# Patient Record
Sex: MALE | Race: WHITE | NOT HISPANIC OR LATINO | Employment: FULL TIME | URBAN - METROPOLITAN AREA
[De-identification: names, ages, dates, MRNs, and addresses within clinical notes are randomized per-mention and may not be internally consistent; named-entity substitution may affect disease eponyms.]

---

## 2019-03-28 ENCOUNTER — HOSPITAL ENCOUNTER (EMERGENCY)
Facility: HOSPITAL | Age: 49
Discharge: HOME | End: 2019-03-28
Attending: EMERGENCY MEDICINE
Payer: OTHER MISCELLANEOUS

## 2019-03-28 VITALS
DIASTOLIC BLOOD PRESSURE: 110 MMHG | HEIGHT: 70 IN | SYSTOLIC BLOOD PRESSURE: 201 MMHG | OXYGEN SATURATION: 100 % | BODY MASS INDEX: 26.22 KG/M2 | TEMPERATURE: 98 F | RESPIRATION RATE: 15 BRPM | HEART RATE: 77 BPM | WEIGHT: 183.19 LBS

## 2019-03-28 DIAGNOSIS — K40.90 HERNIA, INGUINAL, RIGHT: Primary | ICD-10-CM

## 2019-03-28 PROCEDURE — 99283 EMERGENCY DEPT VISIT LOW MDM: CPT

## 2019-03-28 RX ORDER — DOCUSATE SODIUM 100 MG/1
100 CAPSULE, LIQUID FILLED ORAL 3 TIMES DAILY PRN
Qty: 30 CAPSULE | Refills: 0 | Status: SHIPPED | OUTPATIENT
Start: 2019-03-28

## 2019-03-28 NOTE — ED PROVIDER NOTES
"SCRIBE #1 NOTE: I, Jennifer Torres, am scribing for, and in the presence of, Diego Spencer MD. I have scribed the entire note.       History     Chief Complaint   Patient presents with    Abdominal Pain     pt reports heavy lifting yesterday & "pulling something in his abdomen." pt reports knot to groin area + pain this morning.     Review of patient's allergies indicates:  No Known Allergies      History of Present Illness     HPI    3/28/2019, 10:00 AM  History obtained from the patient      History of Present Illness: Rajesh Swan is a 48 y.o. male patient with a PMHx of HTN who presents to the Emergency Department for evaluation of R groin pain which onset gradually 1 day ago. Pt reports heavy lifting 1 day ago. He states the pain is a burning sensation, and this AM he woke up with a knot to his R groin. Pt denies any previous hx of hernia. Symptoms are constant and moderate in severity. No mitigating or exacerbating factors reported. No associated sxs. Patient denies any fever, chills, n/v/d, dysuria, hematuria, flank pain, penile pain, scrotal swelling, and all other sxs at this time. No further complaints or concerns at this time.       Arrival mode: Personal vehicle     PCP: Primary Doctor No        Past Medical History:  Past Medical History:   Diagnosis Date    Depression     Hypertension     Narcotic abuse        Past Surgical History:  History reviewed. No pertinent surgical history.    Family History:  History reviewed. No pertinent family history.    Social History:  Social History     Tobacco Use    Smoking status: Current Every Day Smoker     Packs/day: 0.50     Types: Cigarettes    Smokeless tobacco: Former User   Substance and Sexual Activity    Alcohol use: No    Drug use: No     Comment: Recovering narcotic addict    Sexual activity: Unknown        Review of Systems     Review of Systems   Constitutional: Negative for chills and fever.   HENT: Negative for congestion and sore " throat.    Respiratory: Negative for cough and shortness of breath.    Cardiovascular: Negative for chest pain.   Gastrointestinal: Negative for abdominal pain, diarrhea, nausea and vomiting.   Genitourinary: Negative for dysuria, flank pain, hematuria, penile pain and scrotal swelling.        (+) groin pain   Musculoskeletal: Negative for back pain and neck pain.   Skin: Negative for rash.   Neurological: Negative for dizziness, weakness, numbness and headaches.   Hematological: Does not bruise/bleed easily.   All other systems reviewed and are negative.       Physical Exam     Initial Vitals [03/28/19 0947]   BP Pulse Resp Temp SpO2   (!) 201/110 77 15 97.9 °F (36.6 °C) 100 %      MAP       --          Physical Exam  Nursing Notes and Vital Signs Reviewed.  Constitutional: Patient is in no acute distress. Well-developed and well-nourished.  Head: Atraumatic. Normocephalic.  Eyes: PERRL. EOM intact. Conjunctivae are not pale. No scleral icterus.  ENT: Mucous membranes are moist. Oropharynx is clear and symmetric.    Neck: Supple. Full ROM. No lymphadenopathy.  Cardiovascular: Regular rate. Regular rhythm. No murmurs, rubs, or gallops. Distal pulses are 2+ and symmetric.  Pulmonary/Chest: No respiratory distress. Clear to auscultation bilaterally. No wheezing or rales.  Abdominal: Soft and non-distended.  There is no tenderness.  No rebound, guarding, or rigidity. Good bowel sounds.  Genitourinary: No CVA tenderness. Reducible hernia to R inguinal canal  Musculoskeletal: Moves all extremities. No obvious deformities. No edema. No calf tenderness.  Skin: Warm and dry.  Neurological:  Alert, awake, and appropriate.  Normal speech.  No acute focal neurological deficits are appreciated.  Psychiatric: Normal affect. Good eye contact. Appropriate in content.     ED Course   Procedures  ED Vital Signs:  Vitals:    03/28/19 0947   BP: (!) 201/110   Pulse: 77   Resp: 15   Temp: 97.9 °F (36.6 °C)   TempSrc: Oral   SpO2: 100%  "  Weight: 83.1 kg (183 lb 3.2 oz)   Height: 5' 10" (1.778 m)            The Emergency Provider reviewed the vital signs and test results, which are outlined above.     ED Discussion     10:04 AM: Reassessed pt at this time. Patient is awake, alert, and in NAD. Pt states his condition has improved at this time. Discussed with pt all pertinent ED information and results. Discussed pt dx and plan of tx. Gave pt all f/u and return to the ED instructions. All questions and concerns were addressed at this time. Pt expresses understanding of information and instructions, and is comfortable with plan to discharge. Pt is stable for discharge.    I discussed with patient and/or family/caretaker that evaluation in the ED does not suggest any emergent or life threatening medical conditions requiring immediate intervention beyond what was provided in the ED, and I believe patient is safe for discharge.  Regardless, an unremarkable evaluation in the ED does not preclude the development or presence of a serious of life threatening condition. As such, patient was instructed to return immediately for any worsening or change in current symptoms.    ED Medication(s):  Medications - No data to display    New Prescriptions    DOCUSATE SODIUM (COLACE) 100 MG CAPSULE    Take 1 capsule (100 mg total) by mouth 3 (three) times daily as needed for Constipation.       Follow-up Information     General Surgery.    Contact information:  [phone]                                   Scribe Attestation:   Scribe #1: I performed the above scribed service and the documentation accurately describes the services I performed. I attest to the accuracy of the note.     Attending:   Physician Attestation Statement for Scribe #1: I, Diego Spencer MD, personally performed the services described in this documentation, as scribed by Jennifer Torres, in my presence, and it is both accurate and complete.           Clinical Impression       ICD-10-CM ICD-9-CM "   1. Hernia, inguinal, right K40.90 550.90       Disposition:   Disposition: Discharged  Condition: Stable         Diego Spencer MD  03/28/19 1016

## 2019-04-03 ENCOUNTER — TELEPHONE (OUTPATIENT)
Dept: SURGERY | Facility: CLINIC | Age: 49
End: 2019-04-03

## 2019-04-03 NOTE — TELEPHONE ENCOUNTER
----- Message from Lisa Brand LPN sent at 4/3/2019 11:11 AM CDT -----      ----- Message -----  From: Gabriela Rangel  Sent: 4/3/2019  10:26 AM  To: [facility] Clinical Support    . This patient was seen in ED for a hernia and was advised to schedule appt with general surgery. This is a work comp patient. Does general surgery see patients under w/comp?      Thanks

## 2019-04-08 ENCOUNTER — OFFICE VISIT (OUTPATIENT)
Dept: SURGERY | Facility: CLINIC | Age: 49
End: 2019-04-08
Payer: OTHER MISCELLANEOUS

## 2019-04-08 ENCOUNTER — LAB VISIT (OUTPATIENT)
Dept: LAB | Facility: HOSPITAL | Age: 49
End: 2019-04-08
Attending: SURGERY
Payer: OTHER MISCELLANEOUS

## 2019-04-08 ENCOUNTER — CLINICAL SUPPORT (OUTPATIENT)
Dept: CARDIOLOGY | Facility: CLINIC | Age: 49
End: 2019-04-08
Payer: OTHER MISCELLANEOUS

## 2019-04-08 VITALS
BODY MASS INDEX: 25.85 KG/M2 | TEMPERATURE: 98 F | DIASTOLIC BLOOD PRESSURE: 100 MMHG | HEIGHT: 70 IN | SYSTOLIC BLOOD PRESSURE: 157 MMHG | WEIGHT: 180.56 LBS | HEART RATE: 86 BPM

## 2019-04-08 DIAGNOSIS — K40.20 NON-RECURRENT BILATERAL INGUINAL HERNIA WITHOUT OBSTRUCTION OR GANGRENE: ICD-10-CM

## 2019-04-08 DIAGNOSIS — K40.20 NON-RECURRENT BILATERAL INGUINAL HERNIA WITHOUT OBSTRUCTION OR GANGRENE: Primary | ICD-10-CM

## 2019-04-08 LAB
ALBUMIN SERPL BCP-MCNC: 3.9 G/DL (ref 3.5–5.2)
ALP SERPL-CCNC: 67 U/L (ref 55–135)
ALT SERPL W/O P-5'-P-CCNC: 39 U/L (ref 10–44)
ANION GAP SERPL CALC-SCNC: 5 MMOL/L (ref 8–16)
AST SERPL-CCNC: 27 U/L (ref 10–40)
BASOPHILS # BLD AUTO: 0.03 K/UL (ref 0–0.2)
BASOPHILS NFR BLD: 0.4 % (ref 0–1.9)
BILIRUB SERPL-MCNC: 0.4 MG/DL (ref 0.1–1)
BUN SERPL-MCNC: 18 MG/DL (ref 6–20)
CALCIUM SERPL-MCNC: 9.7 MG/DL (ref 8.7–10.5)
CHLORIDE SERPL-SCNC: 106 MMOL/L (ref 95–110)
CO2 SERPL-SCNC: 29 MMOL/L (ref 23–29)
CREAT SERPL-MCNC: 1 MG/DL (ref 0.5–1.4)
DIFFERENTIAL METHOD: NORMAL
EOSINOPHIL # BLD AUTO: 0.2 K/UL (ref 0–0.5)
EOSINOPHIL NFR BLD: 3.2 % (ref 0–8)
ERYTHROCYTE [DISTWIDTH] IN BLOOD BY AUTOMATED COUNT: 13.2 % (ref 11.5–14.5)
EST. GFR  (AFRICAN AMERICAN): >60 ML/MIN/1.73 M^2
EST. GFR  (NON AFRICAN AMERICAN): >60 ML/MIN/1.73 M^2
GLUCOSE SERPL-MCNC: 75 MG/DL (ref 70–110)
HCT VFR BLD AUTO: 48.1 % (ref 40–54)
HGB BLD-MCNC: 15.9 G/DL (ref 14–18)
IMM GRANULOCYTES # BLD AUTO: 0.02 K/UL (ref 0–0.04)
IMM GRANULOCYTES NFR BLD AUTO: 0.3 % (ref 0–0.5)
LYMPHOCYTES # BLD AUTO: 2.2 K/UL (ref 1–4.8)
LYMPHOCYTES NFR BLD: 31 % (ref 18–48)
MCH RBC QN AUTO: 29.6 PG (ref 27–31)
MCHC RBC AUTO-ENTMCNC: 33.1 G/DL (ref 32–36)
MCV RBC AUTO: 89 FL (ref 82–98)
MONOCYTES # BLD AUTO: 0.8 K/UL (ref 0.3–1)
MONOCYTES NFR BLD: 10.7 % (ref 4–15)
NEUTROPHILS # BLD AUTO: 3.9 K/UL (ref 1.8–7.7)
NEUTROPHILS NFR BLD: 54.4 % (ref 38–73)
NRBC BLD-RTO: 0 /100 WBC
PLATELET # BLD AUTO: 241 K/UL (ref 150–350)
PMV BLD AUTO: 10.7 FL (ref 9.2–12.9)
POTASSIUM SERPL-SCNC: 4.3 MMOL/L (ref 3.5–5.1)
PROT SERPL-MCNC: 7.7 G/DL (ref 6–8.4)
RBC # BLD AUTO: 5.38 M/UL (ref 4.6–6.2)
SODIUM SERPL-SCNC: 140 MMOL/L (ref 136–145)
WBC # BLD AUTO: 7.1 K/UL (ref 3.9–12.7)

## 2019-04-08 PROCEDURE — 93005 ELECTROCARDIOGRAM TRACING: CPT | Mod: S$GLB,,, | Performed by: SURGERY

## 2019-04-08 PROCEDURE — 80053 COMPREHEN METABOLIC PANEL: CPT

## 2019-04-08 PROCEDURE — 99214 OFFICE O/P EST MOD 30 MIN: CPT | Mod: PBBFAC,25,PN | Performed by: SURGERY

## 2019-04-08 PROCEDURE — 93010 ELECTROCARDIOGRAM REPORT: CPT | Mod: S$GLB,,, | Performed by: NUCLEAR MEDICINE

## 2019-04-08 PROCEDURE — 85025 COMPLETE CBC W/AUTO DIFF WBC: CPT

## 2019-04-08 PROCEDURE — 99999 PR PBB SHADOW E&M-EST. PATIENT-LVL IV: CPT | Mod: PBBFAC,,, | Performed by: SURGERY

## 2019-04-08 PROCEDURE — 93005 EKG 12-LEAD: ICD-10-PCS | Mod: S$GLB,,, | Performed by: SURGERY

## 2019-04-08 PROCEDURE — 99203 PR OFFICE/OUTPT VISIT, NEW, LEVL III, 30-44 MIN: ICD-10-PCS | Mod: S$GLB,,, | Performed by: SURGERY

## 2019-04-08 PROCEDURE — 93005 ELECTROCARDIOGRAM TRACING: CPT | Mod: PBBFAC,PN | Performed by: NUCLEAR MEDICINE

## 2019-04-08 PROCEDURE — 93010 EKG 12-LEAD: ICD-10-PCS | Mod: S$GLB,,, | Performed by: NUCLEAR MEDICINE

## 2019-04-08 PROCEDURE — 99203 OFFICE O/P NEW LOW 30 MIN: CPT | Mod: S$GLB,,, | Performed by: SURGERY

## 2019-04-08 PROCEDURE — 36415 COLL VENOUS BLD VENIPUNCTURE: CPT

## 2019-04-08 PROCEDURE — 99999 PR PBB SHADOW E&M-EST. PATIENT-LVL IV: ICD-10-PCS | Mod: PBBFAC,,, | Performed by: SURGERY

## 2019-04-08 RX ORDER — HYDROCHLOROTHIAZIDE 25 MG/1
25 TABLET ORAL DAILY
Qty: 30 TABLET | Refills: 0 | Status: SHIPPED | OUTPATIENT
Start: 2019-04-08

## 2019-04-08 RX ORDER — METOPROLOL SUCCINATE 50 MG/1
50 TABLET, EXTENDED RELEASE ORAL DAILY
Qty: 30 TABLET | Refills: 0 | Status: SHIPPED | OUTPATIENT
Start: 2019-04-08 | End: 2020-01-29

## 2019-04-08 RX ORDER — SODIUM CHLORIDE 9 MG/ML
INJECTION, SOLUTION INTRAVENOUS CONTINUOUS
Status: CANCELLED | OUTPATIENT
Start: 2019-04-08

## 2019-04-08 RX ORDER — LIDOCAINE HYDROCHLORIDE 10 MG/ML
1 INJECTION, SOLUTION EPIDURAL; INFILTRATION; INTRACAUDAL; PERINEURAL ONCE
Status: CANCELLED | OUTPATIENT
Start: 2019-04-08 | End: 2019-04-08

## 2019-04-08 RX ORDER — LISINOPRIL 40 MG/1
40 TABLET ORAL DAILY
Qty: 30 TABLET | Refills: 0 | Status: SHIPPED | OUTPATIENT
Start: 2019-04-08 | End: 2020-01-29

## 2019-04-08 NOTE — PATIENT INSTRUCTIONS
Hernia (Adult)    A hernia can happen when there is a weakness or defect in the wall of the abdomen or groin. Intestines or nearby tissues may move from their usual location and push through the weakness in the wall. This can cause a hernia (bulge) you may see or feel.  Causes and Risk Factors   A hernia may be present at birth. Or it may be caused by the wear and tear of daily living. Certain factors can make a hernia more likely. These can include:  · Heavy lifting  · Straining, whether from lifting, movement, or constipation  · Chronic cough  · Injury to the abdominal wall  · Excess weight  · Pregnancy  · Prior surgery  · Older age  · Family history of hernia  Symptoms  Symptoms of a hernia may come on suddenly. Or they may appear slowly over time. Some common symptoms include:  · Bulge in the groin area, around the navel, or in the scrotum (the bulge may get bigger when you stand and go away when you lie down)  · Pain or pressure around the bulge  · Pain during activities such as lifting, coughing, or sneezing  · A feeling of weakness or pressure in the groin  · Pain or swelling in the scrotum  Types of hernias  There are different types of hernia. The type you have depends on its location:  · Inguinal: This type is in the groin or scrotum. It is more common in men.  · Femoral: This type is in the groin, upper thigh (where the leg bends), or labia. It is more common in women.  · Ventral: This type is in the abdominal wall.  · Umbilical: This type occurs around the navel (belly button).  · Incisional: This type occurs at the site of a previous surgery.  The condition of the hernia can help determine how urgently it needs to be treated.  · Reducible: It goes back in by itself, or it can be pushed back in.  · Irreducible: It cant be pushed back in.  · Incarcerated/Strangulated: The intestine is trapped (incarcerated). If this happens, you wont be able to push the bulge back in. If the incarcerated hernia isnt  treated, it may become strangulated. This means the area loses blood supply and the tissue may die. This requires emergency surgery! Treatment is needed right away!  In most cases, a hernia will not heal on its own. Surgery is usually needed to repair the defect in the abdominal wall or groin. Youll be told more about surgery, if needed.  If your symptoms are not severe, treatment may sometimes be delayed. In such cases, regular follow-up visits with the provider will be needed. Youll be asked to keep track of your symptoms and to watch for signs of more serious problems. You may also be given guidelines similar to the home care instructions below.  Home Care  To help keep a hernia from getting worse, you may be advised to:  · Avoid heavy lifting and straining as directed.  · Take steps to prevent constipation, such as eating more fiber and drinking more water. This may help reduce straining that can occur when having a bowel movement. Reducing straining may help keep your symptoms from getting worse.  · Maintain a healthy weight or lose excess weight. This can help reduce strain on abdominal muscles and tissues.  · Stop smoking. This can help prevent coughing that may also strain abdominal muscles and tissues.  Follow-up care  Follow up with your healthcare provider, or as directed. If imaging tests were done, they will be reviewed a doctor. You will be told the results and any new findings that may affect your care.  When to seek medical advice  Call your healthcare provider right away if any of these occur:  · Hernia hardens, swells, or grows larger  · Hernia can no longer be pushed back in  · Pain moves to the lower right abdomen (just below the waistline), or spreads to the back  Call 911  Call 911 right away if any of these occur:  · Nausea and vomiting  · Severe pain, redness, or tenderness in the area near the hernia  · Pain worsens quickly and doesnt get better  · Inability to have a bowel movement or  pass gas  · Fever of 100.4°F (38°C) or higher  · Trouble breathing  · Fainting  · Rapid heart rate  · Vomiting blood  · Large amounts of blood in stool  Date Last Reviewed: 6/9/2015  © 5511-0418 [employer]. [address] [place] [address], [place] [address]. All rights reserved. This information is not intended as a substitute for professional medical care. Always follow your healthcare professional's instructions.

## 2019-04-09 NOTE — PROGRESS NOTES
History & Physical    SUBJECTIVE:     History of Present Illness:  Patient is a 48 y.o. male referred for inguinal hernia.  Patient reports having bulge initially started his right groin with some discomfort but now has a bulge in his left groin as well. It does go down when he presses on it and lies flat.    Chief Complaint   Patient presents with    Consult       Review of patient's allergies indicates:  No Known Allergies    Current Outpatient Medications   Medication Sig Dispense Refill    ibuprofen (ADVIL,MOTRIN) 600 MG tablet Take 600 mg by mouth 2 (two) times daily as needed for Pain.      docusate sodium (COLACE) 100 MG capsule Take 1 capsule (100 mg total) by mouth 3 (three) times daily as needed for Constipation. 30 capsule 0    hydroCHLOROthiazide (HYDRODIURIL) 25 MG tablet Take 1 tablet (25 mg total) by mouth once daily. 30 tablet 0    lisinopril (PRINIVIL,ZESTRIL) 40 MG tablet Take 1 tablet (40 mg total) by mouth once daily. 30 tablet 0    metoprolol succinate (TOPROL-XL) 50 MG 24 hr tablet Take 1 tablet (50 mg total) by mouth once daily. 30 tablet 0     No current facility-administered medications for this visit.        Past Medical History:   Diagnosis Date    Depression     Hypertension     Narcotic abuse      History reviewed. No pertinent surgical history.  History reviewed. No pertinent family history.  Social History     Tobacco Use    Smoking status: Current Every Day Smoker     Packs/day: 0.50     Types: Cigarettes    Smokeless tobacco: Former User   Substance Use Topics    Alcohol use: No    Drug use: No     Comment: Recovering narcotic addict        Review of Systems:  Review of Systems   Constitutional: Negative for chills, fever and unexpected weight change.   HENT: Negative for congestion.    Eyes: Negative for visual disturbance.   Respiratory: Negative for shortness of breath.    Cardiovascular: Negative for chest pain.   Gastrointestinal: Negative for abdominal distention,  "abdominal pain, constipation, nausea, rectal pain and vomiting.   Genitourinary: Negative for dysuria.   Musculoskeletal: Negative for arthralgias.   Skin: Negative for rash.   Neurological: Negative for light-headedness.   Hematological: Negative for adenopathy.       OBJECTIVE:     Vital Signs (Most Recent)  Temp: 98.1 °F (36.7 °C) (04/08/19 1131)  Pulse: 86 (04/08/19 1131)  BP: (!) 157/100 (04/08/19 1131)  5' 10" (1.778 m)  81.9 kg (180 lb 8.9 oz)     Physical Exam:  Physical Exam   Constitutional: He is oriented to person, place, and time. He appears well-developed and well-nourished. No distress.   HENT:   Head: Normocephalic and atraumatic.   Eyes: EOM are normal.   Neck: Normal range of motion. Neck supple.   Cardiovascular: Normal rate and regular rhythm.   Pulmonary/Chest: Effort normal and breath sounds normal.   Abdominal: Soft. Bowel sounds are normal. He exhibits no distension. There is no tenderness. A hernia (Bilateral inguinal hernia) is present.   Neurological: He is alert and oriented to person, place, and time.   Skin: Skin is warm and dry.   Vitals reviewed.      ASSESSMENT/PLAN:     48-year-old male with bilateral inguinal hernia    PLAN:Plan     Robotic bilateral inguinal hernia repair with mesh 04/23/2019  Preop:  CBC, CMP, EKG  Risk of hernia repair includes infection, bleeding, mesh infection, testicular atrophy/loss, and pain or numbness in the groin that may be long-lasting, recurrence, possible open surgery, need for further procedure.         "

## 2019-04-10 NOTE — PRE ADMISSION SCREENING
Pre op instructions reviewed with patient per phone:    To confirm, Your surgeon has instructed you:  Surgery is scheduled 04/23/19at 1215.  Pre admit office to call afternoon prior to surgery with final arrival time.  If surgery is on Monday, Pre admit office to call Friday afternoon with with final arrival time      Please report to [facility] 1st floor main lobby by 1045.      INSTRUCTIONS IMPORTANT!!!  ¨ No smoking after 12 midnight, the night before surgery.  ¨ No solid food after 12 midnight, but you may have clear liquids up until 3 hours prior to surgery.  This includes: grape, cranberry, and apple juice (not orange, and no coffee.)   ¨ OK to brush teeth, but no gum, candy or mints!    ¨ Take only these medicines with a small swallow of water-morning of surgery.  Metoprolol          ____  Do not wear makeup, including mascara.  ____  No powder, lotions or creams to surgical area.  ____  Please remove all jewelry, including piercings and leave at home.  ____  No money or valuables needed. Please leave at home.  ____  Please bring identification and insurance information to hospital.  ____  If going home the same day, arrange for a ride home. You will not be able to   drive if Anesthesia was used.  ____  Children, under 12 years old, must remain in the waiting room with an adult.  They are not allowed in patient areas.  ____  Wear loose fitting clothing. Allow for dressings, bandages.  ____  Stop Aspirin, Ibuprofen, Motrin and Aleve at least 5-7 days before surgery, unless otherwise instructed by your doctor, or the nurse.   You MAY use Tylenol/acetaminophen until day of surgery.  ____  If you take diabetic medication, do not take am of surgery unless instructed by   Doctor.  ____ Stop taking any Fish Oil supplement or any Vitamins that contain Vitamin E at least 5 days prior to surgery.          Bathing Instructions-- The night before surgery and the morning prior to coming to the  hospital:   -Do not shave the surgical area.   -Shower and wash your hair and body as usual with your regular soap and shampoo.   -Rinse your hair and body completely.   -Use one packet of hibiclens to wash the surgical site (using your hand) gently for 5 minutes.  Do not scrub you skin too hard.   -Do not use hibiclens on your head, face, or genitals.   -Do not wash with regular soap after you use the hibiclens.   -Rinse your body thoroughly.   -Dry with clean, soft towel.  Do not use lotion, cream, deodorant, or powders on   the surgical site.    Use antibacterial soap in place of hibiclens if your surgery is on the head, face or genitals.         Surgical Site Infection    Prevention of surgical site infections:     -Keep incisions clean and dry.   -Do not soak/submerge incisions in water until completely healed.   -Do not apply lotions, powders, creams, or deodorants to site.   -Always make sure hands are cleaned with antibacterial soap/ alcohol-based   prior to touching the surgical site.  (This includes doctors, nurses, staff, and yourself.)    Signs and symptoms:   -Redness and pain around the area where you had surgery   -Drainage of cloudy fluid from your surgical wound   -Fever over 100.4  I have read or had read and explained to me, and understand the above information.

## 2019-04-22 ENCOUNTER — ANESTHESIA EVENT (OUTPATIENT)
Dept: SURGERY | Facility: HOSPITAL | Age: 49
End: 2019-04-22
Payer: OTHER MISCELLANEOUS

## 2019-04-23 ENCOUNTER — ANESTHESIA (OUTPATIENT)
Dept: SURGERY | Facility: HOSPITAL | Age: 49
End: 2019-04-23
Payer: OTHER MISCELLANEOUS

## 2019-04-23 ENCOUNTER — HOSPITAL ENCOUNTER (OUTPATIENT)
Facility: HOSPITAL | Age: 49
Discharge: HOME | End: 2019-04-23
Attending: SURGERY | Admitting: SURGERY
Payer: OTHER MISCELLANEOUS

## 2019-04-23 VITALS
HEIGHT: 71 IN | DIASTOLIC BLOOD PRESSURE: 74 MMHG | WEIGHT: 174.19 LBS | RESPIRATION RATE: 12 BRPM | BODY MASS INDEX: 24.39 KG/M2 | HEART RATE: 68 BPM | OXYGEN SATURATION: 94 % | TEMPERATURE: 98 F | SYSTOLIC BLOOD PRESSURE: 133 MMHG

## 2019-04-23 DIAGNOSIS — K40.90 INGUINAL HERNIA: ICD-10-CM

## 2019-04-23 DIAGNOSIS — K40.20 NON-RECURRENT BILATERAL INGUINAL HERNIA WITHOUT OBSTRUCTION OR GANGRENE: ICD-10-CM

## 2019-04-23 PROCEDURE — 71000033 HC RECOVERY, INTIAL HOUR: Performed by: SURGERY

## 2019-04-23 PROCEDURE — 63600175 PHARM REV CODE 636 W HCPCS: Performed by: NURSE ANESTHETIST, CERTIFIED REGISTERED

## 2019-04-23 PROCEDURE — 37000009 HC ANESTHESIA EA ADD 15 MINS: Performed by: SURGERY

## 2019-04-23 PROCEDURE — 63600175 PHARM REV CODE 636 W HCPCS: Performed by: ANESTHESIOLOGY

## 2019-04-23 PROCEDURE — 25000003 PHARM REV CODE 250: Performed by: NURSE ANESTHETIST, CERTIFIED REGISTERED

## 2019-04-23 PROCEDURE — 49650 LAP ING HERNIA REPAIR INIT: CPT | Mod: 50,,, | Performed by: SURGERY

## 2019-04-23 PROCEDURE — 25000003 PHARM REV CODE 250: Performed by: ANESTHESIOLOGY

## 2019-04-23 PROCEDURE — C1781 MESH (IMPLANTABLE): HCPCS | Performed by: SURGERY

## 2019-04-23 PROCEDURE — 36000710: Performed by: SURGERY

## 2019-04-23 PROCEDURE — 71000015 HC POSTOP RECOV 1ST HR: Performed by: SURGERY

## 2019-04-23 PROCEDURE — 49650 PR LAP,INGUINAL HERNIA REPR,INITIAL: ICD-10-PCS | Mod: 50,,, | Performed by: SURGERY

## 2019-04-23 PROCEDURE — S0020 INJECTION, BUPIVICAINE HYDRO: HCPCS | Performed by: SURGERY

## 2019-04-23 PROCEDURE — 36000711: Performed by: SURGERY

## 2019-04-23 PROCEDURE — 25000003 PHARM REV CODE 250: Performed by: SURGERY

## 2019-04-23 PROCEDURE — 63600175 PHARM REV CODE 636 W HCPCS: Performed by: SURGERY

## 2019-04-23 PROCEDURE — 37000008 HC ANESTHESIA 1ST 15 MINUTES: Performed by: SURGERY

## 2019-04-23 DEVICE — MESH 3DMAX LF MED 7.9CMX13.4CM: Type: IMPLANTABLE DEVICE | Site: GROIN | Status: FUNCTIONAL

## 2019-04-23 DEVICE — MESH 3DMAX RG MED 7.9CMX13.4CM: Type: IMPLANTABLE DEVICE | Site: GROIN | Status: FUNCTIONAL

## 2019-04-23 RX ORDER — LIDOCAINE HYDROCHLORIDE 10 MG/ML
INJECTION, SOLUTION EPIDURAL; INFILTRATION; INTRACAUDAL; PERINEURAL
Status: DISCONTINUED | OUTPATIENT
Start: 2019-04-23 | End: 2019-04-23 | Stop reason: HOSPADM

## 2019-04-23 RX ORDER — SODIUM CHLORIDE 9 MG/ML
INJECTION, SOLUTION INTRAVENOUS CONTINUOUS
Status: DISCONTINUED | OUTPATIENT
Start: 2019-04-23 | End: 2019-04-23 | Stop reason: HOSPADM

## 2019-04-23 RX ORDER — ONDANSETRON 2 MG/ML
INJECTION INTRAMUSCULAR; INTRAVENOUS
Status: DISCONTINUED | OUTPATIENT
Start: 2019-04-23 | End: 2019-04-23

## 2019-04-23 RX ORDER — HYDROCODONE BITARTRATE AND ACETAMINOPHEN 5; 325 MG/1; MG/1
1 TABLET ORAL
Status: DISCONTINUED | OUTPATIENT
Start: 2019-04-23 | End: 2019-04-23 | Stop reason: HOSPADM

## 2019-04-23 RX ORDER — BUPIVACAINE HYDROCHLORIDE 5 MG/ML
INJECTION, SOLUTION EPIDURAL; INTRACAUDAL
Status: DISCONTINUED | OUTPATIENT
Start: 2019-04-23 | End: 2019-04-23 | Stop reason: HOSPADM

## 2019-04-23 RX ORDER — LIDOCAINE HYDROCHLORIDE 10 MG/ML
INJECTION INFILTRATION; PERINEURAL
Status: DISCONTINUED | OUTPATIENT
Start: 2019-04-23 | End: 2019-04-23

## 2019-04-23 RX ORDER — FENTANYL CITRATE 50 UG/ML
INJECTION, SOLUTION INTRAMUSCULAR; INTRAVENOUS
Status: DISCONTINUED | OUTPATIENT
Start: 2019-04-23 | End: 2019-04-23

## 2019-04-23 RX ORDER — FENTANYL CITRATE 50 UG/ML
25 INJECTION, SOLUTION INTRAMUSCULAR; INTRAVENOUS EVERY 5 MIN PRN
Status: DISCONTINUED | OUTPATIENT
Start: 2019-04-23 | End: 2019-04-23 | Stop reason: HOSPADM

## 2019-04-23 RX ORDER — HYDROCODONE BITARTRATE AND ACETAMINOPHEN 5; 325 MG/1; MG/1
1 TABLET ORAL EVERY 4 HOURS PRN
Status: DISCONTINUED | OUTPATIENT
Start: 2019-04-23 | End: 2019-04-23 | Stop reason: HOSPADM

## 2019-04-23 RX ORDER — LIDOCAINE HYDROCHLORIDE 10 MG/ML
1 INJECTION, SOLUTION EPIDURAL; INFILTRATION; INTRACAUDAL; PERINEURAL ONCE
Status: DISCONTINUED | OUTPATIENT
Start: 2019-04-23 | End: 2019-04-23 | Stop reason: HOSPADM

## 2019-04-23 RX ORDER — MIDAZOLAM HYDROCHLORIDE 1 MG/ML
INJECTION, SOLUTION INTRAMUSCULAR; INTRAVENOUS
Status: DISCONTINUED | OUTPATIENT
Start: 2019-04-23 | End: 2019-04-23

## 2019-04-23 RX ORDER — ACETAMINOPHEN 500 MG
1000 TABLET ORAL ONCE AS NEEDED
Status: COMPLETED | OUTPATIENT
Start: 2019-04-23 | End: 2019-04-23

## 2019-04-23 RX ORDER — PROPOFOL 10 MG/ML
VIAL (ML) INTRAVENOUS
Status: DISCONTINUED | OUTPATIENT
Start: 2019-04-23 | End: 2019-04-23

## 2019-04-23 RX ORDER — CEFAZOLIN SODIUM 2 G/50ML
2 SOLUTION INTRAVENOUS
Status: COMPLETED | OUTPATIENT
Start: 2019-04-23 | End: 2019-04-23

## 2019-04-23 RX ORDER — AMOXICILLIN 250 MG
1 CAPSULE ORAL 2 TIMES DAILY
COMMUNITY
Start: 2019-04-23 | End: 2019-05-06

## 2019-04-23 RX ORDER — SODIUM CHLORIDE, SODIUM LACTATE, POTASSIUM CHLORIDE, CALCIUM CHLORIDE 600; 310; 30; 20 MG/100ML; MG/100ML; MG/100ML; MG/100ML
INJECTION, SOLUTION INTRAVENOUS CONTINUOUS PRN
Status: DISCONTINUED | OUTPATIENT
Start: 2019-04-23 | End: 2019-04-23

## 2019-04-23 RX ORDER — ONDANSETRON 2 MG/ML
4 INJECTION INTRAMUSCULAR; INTRAVENOUS DAILY PRN
Status: DISCONTINUED | OUTPATIENT
Start: 2019-04-23 | End: 2019-04-23 | Stop reason: HOSPADM

## 2019-04-23 RX ORDER — DIPHENHYDRAMINE HCL 25 MG
25 CAPSULE ORAL EVERY 6 HOURS PRN
Status: DISCONTINUED | OUTPATIENT
Start: 2019-04-23 | End: 2019-04-23 | Stop reason: HOSPADM

## 2019-04-23 RX ORDER — ONDANSETRON 2 MG/ML
4 INJECTION INTRAMUSCULAR; INTRAVENOUS EVERY 12 HOURS PRN
Status: DISCONTINUED | OUTPATIENT
Start: 2019-04-23 | End: 2019-04-23 | Stop reason: HOSPADM

## 2019-04-23 RX ORDER — NEOSTIGMINE METHYLSULFATE 1 MG/ML
INJECTION, SOLUTION INTRAVENOUS
Status: DISCONTINUED | OUTPATIENT
Start: 2019-04-23 | End: 2019-04-23

## 2019-04-23 RX ORDER — HYDROCODONE BITARTRATE AND ACETAMINOPHEN 10; 325 MG/1; MG/1
1 TABLET ORAL EVERY 4 HOURS PRN
Status: DISCONTINUED | OUTPATIENT
Start: 2019-04-23 | End: 2019-04-23 | Stop reason: HOSPADM

## 2019-04-23 RX ORDER — ROCURONIUM BROMIDE 10 MG/ML
INJECTION, SOLUTION INTRAVENOUS
Status: DISCONTINUED | OUTPATIENT
Start: 2019-04-23 | End: 2019-04-23

## 2019-04-23 RX ORDER — HYDROMORPHONE HYDROCHLORIDE 2 MG/ML
0.2 INJECTION, SOLUTION INTRAMUSCULAR; INTRAVENOUS; SUBCUTANEOUS EVERY 5 MIN PRN
Status: DISCONTINUED | OUTPATIENT
Start: 2019-04-23 | End: 2019-04-23 | Stop reason: HOSPADM

## 2019-04-23 RX ORDER — MEPERIDINE HYDROCHLORIDE 50 MG/ML
12.5 INJECTION INTRAMUSCULAR; INTRAVENOUS; SUBCUTANEOUS ONCE AS NEEDED
Status: DISCONTINUED | OUTPATIENT
Start: 2019-04-23 | End: 2019-04-23 | Stop reason: HOSPADM

## 2019-04-23 RX ORDER — DEXAMETHASONE SODIUM PHOSPHATE 4 MG/ML
INJECTION, SOLUTION INTRA-ARTICULAR; INTRALESIONAL; INTRAMUSCULAR; INTRAVENOUS; SOFT TISSUE
Status: DISCONTINUED | OUTPATIENT
Start: 2019-04-23 | End: 2019-04-23

## 2019-04-23 RX ORDER — GLYCOPYRROLATE 0.2 MG/ML
INJECTION INTRAMUSCULAR; INTRAVENOUS
Status: DISCONTINUED | OUTPATIENT
Start: 2019-04-23 | End: 2019-04-23

## 2019-04-23 RX ORDER — HYDROCODONE BITARTRATE AND ACETAMINOPHEN 5; 325 MG/1; MG/1
2 TABLET ORAL EVERY 4 HOURS PRN
Qty: 30 TABLET | Refills: 0 | Status: SHIPPED | OUTPATIENT
Start: 2019-04-23 | End: 2019-05-06

## 2019-04-23 RX ADMIN — FENTANYL CITRATE 25 MCG: 50 INJECTION, SOLUTION INTRAMUSCULAR; INTRAVENOUS at 04:04

## 2019-04-23 RX ADMIN — ROCURONIUM BROMIDE 50 MG: 10 INJECTION, SOLUTION INTRAVENOUS at 12:04

## 2019-04-23 RX ADMIN — FENTANYL CITRATE 25 MCG: 50 INJECTION INTRAMUSCULAR; INTRAVENOUS at 05:04

## 2019-04-23 RX ADMIN — ONDANSETRON 4 MG: 2 INJECTION, SOLUTION INTRAMUSCULAR; INTRAVENOUS at 04:04

## 2019-04-23 RX ADMIN — HYDROCODONE BITARTRATE AND ACETAMINOPHEN 1 TABLET: 5; 325 TABLET ORAL at 05:04

## 2019-04-23 RX ADMIN — DEXAMETHASONE SODIUM PHOSPHATE 4 MG: 4 INJECTION, SOLUTION INTRA-ARTICULAR; INTRALESIONAL; INTRAMUSCULAR; INTRAVENOUS; SOFT TISSUE at 01:04

## 2019-04-23 RX ADMIN — SODIUM CHLORIDE, SODIUM LACTATE, POTASSIUM CHLORIDE, AND CALCIUM CHLORIDE: 600; 310; 30; 20 INJECTION, SOLUTION INTRAVENOUS at 12:04

## 2019-04-23 RX ADMIN — ROCURONIUM BROMIDE 10 MG: 10 INJECTION, SOLUTION INTRAVENOUS at 04:04

## 2019-04-23 RX ADMIN — LIDOCAINE HYDROCHLORIDE 5 ML: 10 INJECTION, SOLUTION INFILTRATION; PERINEURAL at 12:04

## 2019-04-23 RX ADMIN — FENTANYL CITRATE 100 MCG: 50 INJECTION, SOLUTION INTRAMUSCULAR; INTRAVENOUS at 12:04

## 2019-04-23 RX ADMIN — ROCURONIUM BROMIDE 10 MG: 10 INJECTION, SOLUTION INTRAVENOUS at 01:04

## 2019-04-23 RX ADMIN — ROBINUL 0.8 MG: 0.2 INJECTION INTRAMUSCULAR; INTRAVENOUS at 04:04

## 2019-04-23 RX ADMIN — ACETAMINOPHEN 1000 MG: 500 TABLET ORAL at 11:04

## 2019-04-23 RX ADMIN — CEFAZOLIN SODIUM 2 G: 2 SOLUTION INTRAVENOUS at 12:04

## 2019-04-23 RX ADMIN — NEOSTIGMINE METHYLSULFATE 5 MG: 1 INJECTION INTRAVENOUS at 04:04

## 2019-04-23 RX ADMIN — PROPOFOL 160 MG: 10 INJECTION, EMULSION INTRAVENOUS at 12:04

## 2019-04-23 RX ADMIN — SODIUM CHLORIDE, SODIUM LACTATE, POTASSIUM CHLORIDE, AND CALCIUM CHLORIDE: 600; 310; 30; 20 INJECTION, SOLUTION INTRAVENOUS at 02:04

## 2019-04-23 RX ADMIN — ROCURONIUM BROMIDE 10 MG: 10 INJECTION, SOLUTION INTRAVENOUS at 03:04

## 2019-04-23 RX ADMIN — ROCURONIUM BROMIDE 10 MG: 10 INJECTION, SOLUTION INTRAVENOUS at 02:04

## 2019-04-23 RX ADMIN — MIDAZOLAM 2 MG: 1 INJECTION INTRAMUSCULAR; INTRAVENOUS at 12:04

## 2019-04-23 NOTE — BRIEF OP NOTE
[facility]  Brief Operative Note     SUMMARY     Surgery Date: 4/23/2019     Surgeon(s) and Role:     * Sony Burger MD - Primary    Assisting Surgeon: None    Pre-op Diagnosis:  Non-recurrent bilateral inguinal hernia without obstruction or gangrene [K40.20]    Post-op Diagnosis:  Post-Op Diagnosis Codes:     * Non-recurrent bilateral inguinal hernia without obstruction or gangrene [K40.20]    Procedure(s) (LRB):  XI ROBOTIC REPAIR, HERNIA, INGUINAL, BILATERAL (Bilateral)    Anesthesia: General    Description of the findings of the procedure:  Robotic bilateral inguinal hernia repair    Findings/Key Components:  See op note    Estimated Blood Loss: 20 mL         Specimens:   Specimen (12h ago, onward)    None          Discharge Note    SUMMARY     Admit Date: 4/23/2019    Discharge Date and Time:  04/23/2019 4:46 PM    Hospital Course the patient underwent bilateral inguinal hernia repair and was discharged postoperatively    Final Diagnosis: Post-Op Diagnosis Codes:     * Non-recurrent bilateral inguinal hernia without obstruction or gangrene [K40.20]    Disposition: Home or Self Care    Follow Up/Patient Instructions:     Medications:  Reconciled Home Medications:      Medication List      START taking these medications    HYDROcodone-acetaminophen 5-325 mg per tablet  Commonly known as:  NORCO  Take 2 tablets by mouth every 4 (four) hours as needed for Pain.     senna-docusate 8.6-50 mg 8.6-50 mg per tablet  Commonly known as:  PERICOLACE  Take 1 tablet by mouth 2 (two) times daily.        CONTINUE taking these medications    docusate sodium 100 MG capsule  Commonly known as:  COLACE  Take 1 capsule (100 mg total) by mouth 3 (three) times daily as needed for Constipation.     hydroCHLOROthiazide 25 MG tablet  Commonly known as:  HYDRODIURIL  Take 1 tablet (25 mg total) by mouth once daily.     ibuprofen 600 MG tablet  Commonly known as:  ADVIL,MOTRIN  Take 600 mg by mouth 2 (two) times daily as  needed for Pain.     lisinopril 40 MG tablet  Commonly known as:  PRINIVIL,ZESTRIL  Take 1 tablet (40 mg total) by mouth once daily.     metoprolol succinate 50 MG 24 hr tablet  Commonly known as:  TOPROL-XL  Take 1 tablet (50 mg total) by mouth once daily.          Discharge Procedure Orders   Diet general     Ice to affected area     Lifting restrictions     Call MD for:  temperature >100.4     Call MD for:  persistent nausea and vomiting     Call MD for:  severe uncontrolled pain     Call MD for:  difficulty breathing, headache or visual disturbances     Call MD for:  redness, tenderness, or signs of infection (pain, swelling, redness, odor or green/yellow discharge around incision site)     Call MD for:  hives     Call MD for:  persistent dizziness or light-headedness     Call MD for:  extreme fatigue     No dressing needed     Activity as tolerated     Shower on day dressing removed (No bath)   Order Comments: 48 hours     Follow-up Information     Sony Burger MD In 2 weeks.    Specialties:  General Surgery, Bariatrics  Contact information:  [address] [facility]  4th Floor  [facility] [address] [phone]

## 2019-04-23 NOTE — ANESTHESIA PREPROCEDURE EVALUATION
04/23/2019  Rajesh Swan is a 48 y.o., male.    Anesthesia Evaluation    I have reviewed the Patient Summary Reports.    I have reviewed the Nursing Notes.   I have reviewed the Medications.     Review of Systems  Anesthesia Hx:  No problems with previous Anesthesia  Denies Family Hx of Anesthesia complications.   Denies Personal Hx of Anesthesia complications.   Social:  Smoker    Hematology/Oncology:  Hematology Normal   Oncology Normal     EENT/Dental:EENT/Dental Normal   Cardiovascular:   Hypertension    Pulmonary:  Pulmonary Normal    Renal/:  Renal/ Normal     Hepatic/GI:  Hepatic/GI Normal    Musculoskeletal:  Musculoskeletal Normal    Neurological:  Neurology Normal    Endocrine:  Endocrine Normal    Psych:   depression          Physical Exam  General:  Well nourished    Airway/Jaw/Neck:  Airway Findings: General Airway Assessment: Adult Mallampati: II  TM Distance: Normal, at least 6 cm  Jaw/Neck Findings:  Neck ROM: Normal ROM       Chest/Lungs:  Chest/Lungs Findings: Clear to auscultation, Normal Respiratory Rate     Heart/Vascular:  Heart Findings: Rate: Normal  Rhythm: Regular Rhythm        Mental Status:  Mental Status Findings:  Cooperative, Alert and Oriented         Anesthesia Plan  Type of Anesthesia, risks & benefits discussed:  Anesthesia Type:  general  Patient's Preference:   Intra-op Monitoring Plan:   Intra-op Monitoring Plan Comments:   Post Op Pain Control Plan:   Post Op Pain Control Plan Comments:   Induction:   IV  Beta Blocker:  Patient is not currently on a Beta-Blocker (No further documentation required).       Informed Consent: Patient understands risks and agrees with Anesthesia plan.  Questions answered. Anesthesia consent signed with patient.  ASA Score: 3     Day of Surgery Review of History & Physical: I have interviewed and examined the patient. I have  reviewed the patient's H&P dated:  There are no significant changes.  H&P update referred to the surgeon.         Ready For Surgery From Anesthesia Perspective.

## 2019-04-23 NOTE — ANESTHESIA POSTPROCEDURE EVALUATION
Anesthesia Post Evaluation    Patient: Rajesh Swan    Procedure(s) Performed: Procedure(s) (LRB):  XI ROBOTIC REPAIR, HERNIA, INGUINAL, BILATERAL (Bilateral)    Final Anesthesia Type: general  Patient location during evaluation: PACU  Patient participation: Yes- Able to Participate  Level of consciousness: awake and alert  Post-procedure vital signs: reviewed and stable  Pain management: adequate  Airway patency: patent  PONV status at discharge: No PONV  Anesthetic complications: no      Cardiovascular status: blood pressure returned to baseline  Respiratory status: unassisted and spontaneous ventilation  Hydration status: euvolemic  Follow-up not needed.          Vitals Value Taken Time   /74 4/23/2019  5:16 PM   Temp 36.8 °C (98.3 °F) 4/23/2019  5:15 PM   Pulse 70 4/23/2019  5:17 PM   Resp 21 4/23/2019  5:17 PM   SpO2 89 % 4/23/2019  5:18 PM   Vitals shown include unvalidated device data.      Event Time     Out of Recovery 17:19:42          Pain/Maikel Score: Pain Rating Prior to Med Admin: 7 (4/23/2019  5:07 PM)  Maikel Score: 10 (4/23/2019  5:15 PM)

## 2019-04-23 NOTE — ANESTHESIA PROCEDURE NOTES
Intubation    Diagnosis: hernia  Patient location during procedure: done in OR  Staffing  Resident/CRNA: Jaelyn Curran CRNA  Performed: resident/CRNA   Preanesthetic Checklist  Completed: patient identified, site marked, surgical consent, pre-op evaluation, timeout performed, IV checked, risks and benefits discussed, monitors and equipment checked and anesthesia consent given  Intubation  Indication: surgery  Pre-oxygenation. Induction: intravenous, mask ventilation: easy mask.  Intubation: postinduction, laryngoscopy direct, Tate 2.  Endotracheal Tube: oral, 7.5 mm ID, cuffed (inflated to minimal occlusive pressure)  Attempts: 1, Grade I - full view of cords  Complicating Factors: none  Tube secured at 23 cm at the lips.  Findings post-intubation: bilateral breath sounds, positive ETCO2, atraumatic / condition of teeth unchanged  Position Confirmation: auscultation  Eye Care: taped after induction / before airway management

## 2019-04-23 NOTE — OP NOTE
[facility]  Surgery Department  Operative Note    SUMMARY     Date of Procedure: 4/23/2019     Procedure: Procedure(s) (LRB):  XI ROBOTIC REPAIR, HERNIA, INGUINAL, BILATERAL (Bilateral)     Surgeon(s) and Role:     * Sony Burger MD - Primary    Assisting Surgeon: None    Pre-Operative Diagnosis: Non-recurrent bilateral inguinal hernia without obstruction or gangrene [K40.20]    Post-Operative Diagnosis: Post-Op Diagnosis Codes:     * Non-recurrent bilateral inguinal hernia without obstruction or gangrene [K40.20]    Anesthesia: General    Technical Procedures Used: robotic inguinal hernia repair    Description of the Findings of the Procedure:  Bilateral indirect inguinal hernias    Complications: No    Estimated Blood Loss (EBL): 10 mL           Implants:   Implant Name Type Inv. Item Serial No.  Lot No. LRB No. Used   MESH 3DMAX LF MED 7.9CMX13.4CM - [id number]  MESH 3DMAX LF MED 7.9CMX13.4CM  [employer] [id number] Left 1   MESH 3DMAX RG MED 7.9CMX13.4CM - [id number]  MESH 3DMAX RG MED 7.9CMX13.4CM  [employer] [id number] Right 1       Specimens:   Specimen (12h ago, onward)    None                  Condition: Good    Disposition: PACU - hemodynamically stable.    Procedure in Detail:  The patient was brought to the OR and underwent general anesthesia.  His prepped and draped in the usual sterile fashion.  Incision was made at the umbilicus.  A Veress needle was inserted and 8 mm robotic port placed Then two 8 mm metal trocars were placed lateral to the left and right mid abdomen. Then the robot was brought in and docked appropriately. The patient was then placed in a steep trendelenburg position was taken, and visualized the inguinal area. A large indirect right inguinal hernia was noted as well as a large left indirect inguinal hernia. We elected to perform the right side 1st.  The mobilization of the peritoneum was then commenced from the most lateral-inferior aspect and brought up well  above the line of anterior superior iliac supine. The dissection was continued medially, identifying epigastric vessels, umbilical vein remnant on the left side, while visualizing the direct defect. The dissection was continued all the way medially to the Marvin's ligament, and visualized the pubic bone as well. The blunt dissection and sharp dissection was done to create a peritoneal flap, and spermatic cord was freed from the flap. Then a 3D max  mesh right side was brought into theport along with to a strata fix. The mesh was placed appropriately according to the manufacture's  Specifics. The mesh was then fixated onto the internal oblique and transversalis muscle and pubic tubercle. The peritoneal flap was closed with 2-0 strata fix. The sutures were removed completely.     We then turned our attention to the left side.The mobilization of the peritoneum was then commenced from the most lateral-inferior aspect and brought up well above the line of anterior superior iliac supine. The dissection was continued medially, identifying epigastric vessels, umbilical vein remnant on the left side, while visualizing the direct defect. The dissection was continued all the way medially to the Marvin's ligament, and visualized the pubic bone as well. The blunt dissection and sharp dissection was done to create a peritoneal flap, and spermatic cord was freed from the flap. Then a 3D max mesh right side was brought into theport along with to a strata fix. The mesh was placed appropriately according to the manufacture's  Specifics. The mesh was then fixated onto the internal oblique and transversalis muscle and pubic tubercle. The peritoneal flap was closed with 2-0 strata fix. The sutures were removed completely.      The ports were removed after undocking the robot.  Local anesthetic was injected.  The incision sites were closed with 4 Monocryl in subcuticular fashion. Patient was transferred to recovery in stable and  satisfactory condition.

## 2019-04-23 NOTE — INTERVAL H&P NOTE
The patient has been examined and the H&P has been reviewed:    I concur with the findings and no changes have occurred since H&P was written.    Anesthesia/Surgery risks, benefits and alternative options discussed and understood by patient/family.          Active Hospital Problems    Diagnosis  POA    Inguinal hernia [K40.90]  Yes      Resolved Hospital Problems   No resolved problems to display.

## 2019-04-29 ENCOUNTER — TELEPHONE (OUTPATIENT)
Dept: SURGERY | Facility: CLINIC | Age: 49
End: 2019-04-29

## 2019-04-29 NOTE — TELEPHONE ENCOUNTER
Saturday he got done finishing shaving down there and noticed he has about the size of a quarter/ gumball size knot down there and would like to know if you would want to see him today or sometime this week or if he is to waiting until his appointment on Monday

## 2019-04-29 NOTE — TELEPHONE ENCOUNTER
----- Message from Lisa Brand LPN sent at 4/29/2019  2:51 PM CDT -----  Contact: self [phone]      ----- Message -----  From: Debora Dalal  Sent: 4/29/2019   2:42 PM  To: Tao Cardoza Staff    Pt would like return call from nurse regarding knot he found following procedure.  Please call back at [phone].  Md Americo

## 2019-04-29 NOTE — TELEPHONE ENCOUNTER
Patient is aware per Dr. Burger, avoid shaving the area as of now, the knot could be scar tissue. As long as he is not having any symptoms or discomfort then he can just come in for his post op appointment on 05/06/2019

## 2019-05-06 ENCOUNTER — OFFICE VISIT (OUTPATIENT)
Dept: SURGERY | Facility: CLINIC | Age: 49
End: 2019-05-06
Payer: OTHER MISCELLANEOUS

## 2019-05-06 ENCOUNTER — TELEPHONE (OUTPATIENT)
Dept: RADIOLOGY | Facility: HOSPITAL | Age: 49
End: 2019-05-06

## 2019-05-06 ENCOUNTER — TELEPHONE (OUTPATIENT)
Dept: SURGERY | Facility: CLINIC | Age: 49
End: 2019-05-06

## 2019-05-06 VITALS
WEIGHT: 174.63 LBS | TEMPERATURE: 99 F | BODY MASS INDEX: 24.45 KG/M2 | HEIGHT: 71 IN | SYSTOLIC BLOOD PRESSURE: 155 MMHG | DIASTOLIC BLOOD PRESSURE: 97 MMHG | HEART RATE: 62 BPM

## 2019-05-06 DIAGNOSIS — N50.89 SCROTAL MASS: Primary | ICD-10-CM

## 2019-05-06 PROCEDURE — 99024 PR POST-OP FOLLOW-UP VISIT: ICD-10-PCS | Mod: S$GLB,,, | Performed by: SURGERY

## 2019-05-06 PROCEDURE — 99024 POSTOP FOLLOW-UP VISIT: CPT | Mod: S$GLB,,, | Performed by: SURGERY

## 2019-05-06 PROCEDURE — 99999 PR PBB SHADOW E&M-EST. PATIENT-LVL III: CPT | Mod: PBBFAC,,, | Performed by: SURGERY

## 2019-05-06 PROCEDURE — 99999 PR PBB SHADOW E&M-EST. PATIENT-LVL III: ICD-10-PCS | Mod: PBBFAC,,, | Performed by: SURGERY

## 2019-05-06 NOTE — PROGRESS NOTES
History & Physical    SUBJECTIVE:     History of Present Illness:  Patient is a 48 y.o. male status post robotic bilateral inguinal hernia 7/5/1 9 presents for postop.  He reports noticing a bulge on his right groin.  It is not tender but continues to be present.    Initially referred for inguinal hernia.  Patient reports having bulge initially started his right groin with some discomfort but now has a bulge in his left groin as well. It does go down when he presses on it and lies flat.    Chief Complaint   Patient presents with    Post-op Evaluation       Review of patient's allergies indicates:  No Known Allergies    Current Outpatient Medications   Medication Sig Dispense Refill    docusate sodium (COLACE) 100 MG capsule Take 1 capsule (100 mg total) by mouth 3 (three) times daily as needed for Constipation. 30 capsule 0    hydroCHLOROthiazide (HYDRODIURIL) 25 MG tablet Take 1 tablet (25 mg total) by mouth once daily. 30 tablet 0    ibuprofen (ADVIL,MOTRIN) 600 MG tablet Take 600 mg by mouth 2 (two) times daily as needed for Pain.      lisinopril (PRINIVIL,ZESTRIL) 40 MG tablet Take 1 tablet (40 mg total) by mouth once daily. 30 tablet 0    metoprolol succinate (TOPROL-XL) 50 MG 24 hr tablet Take 1 tablet (50 mg total) by mouth once daily. 30 tablet 0     No current facility-administered medications for this visit.        Past Medical History:   Diagnosis Date    Asthma     childhood    Depression     Hypertension     Narcotic abuse      Past Surgical History:   Procedure Laterality Date    XI ROBOTIC REPAIR, HERNIA, INGUINAL, BILATERAL Bilateral 4/23/2019    Performed by [facility] at [facility] OR     History reviewed. No pertinent family history.  Social History     Tobacco Use    Smoking status: Current Every Day Smoker     Packs/day: 0.50     Types: Cigarettes    Smokeless tobacco: Former User     Types: Chew    Tobacco comment: no tobacco  after m.n prior to sx   Substance Use Topics     "Alcohol use: Yes     Comment: socially  No alcohol 72h prior to sx    Drug use: No     Comment: Recovering narcotic addict        Review of Systems:  Review of Systems   Constitutional: Negative for chills, fever and unexpected weight change.   HENT: Negative for congestion.    Eyes: Negative for visual disturbance.   Respiratory: Negative for shortness of breath.    Cardiovascular: Negative for chest pain.   Gastrointestinal: Negative for abdominal distention, abdominal pain, constipation, nausea, rectal pain and vomiting.   Genitourinary: Negative for dysuria.   Musculoskeletal: Negative for arthralgias.   Skin: Negative for rash.   Neurological: Negative for light-headedness.   Hematological: Negative for adenopathy.       OBJECTIVE:     Vital Signs (Most Recent)  Temp: 98.5 °F (36.9 °C) (05/06/19 1050)  Pulse: 62 (05/06/19 1050)  BP: (!) 155/97 (05/06/19 1050)  5' 11" (1.803 m)  79.2 kg (174 lb 9.7 oz)     Physical Exam:  Physical Exam   Constitutional: He is oriented to person, place, and time. He appears well-developed and well-nourished. No distress.   HENT:   Head: Normocephalic and atraumatic.   Eyes: EOM are normal.   Neck: Normal range of motion. Neck supple.   Cardiovascular: Normal rate and regular rhythm.   Pulmonary/Chest: Effort normal and breath sounds normal.   Abdominal: Soft. Bowel sounds are normal. He exhibits no distension. There is no tenderness. No hernia.   Well-healed surgical incisions across abdomen no signs of infection  Mass and upper right scrotum   Neurological: He is alert and oriented to person, place, and time.   Skin: Skin is warm and dry.   Vitals reviewed.      ASSESSMENT/PLAN:     48-year-old male status post bilateral right inguinal hernia repair    PLAN:Plan     CT pelvis to evaluate for early hernia recurrence versus retained cord lipoma  Call patient with results  Continue light duty     "

## 2019-05-06 NOTE — TELEPHONE ENCOUNTER
----- Message from Julia Rangel, RT sent at 5/6/2019  4:01 PM CDT -----  Contact: RADIOLOGY    Because patient is a  patient and provider states it's Medically Urgent, WC may not see it like that we will leave patient on scheduled they may or may not cover test..    ----- Message -----  From: Mayte Neal MA  Sent: 5/6/2019   3:40 PM  To: Julia Rangel, RT    Medically urgent   ----- Message -----  From: Sony Burger MD  Sent: 5/6/2019   2:36 PM  To: Mayte Neal MA    Yes  ----- Message -----  From: Mayte Neal MA  Sent: 5/6/2019   2:26 PM  To: Sony Burger MD    Would this be considered medically urgent?    ----- Message -----  From: Lisa Brand LPN  Sent: 5/6/2019   2:00 PM  To: Mayte Neal MA        ----- Message -----  From: Julia Rangel, RT  Sent: 5/6/2019   1:56 PM  To: Tao Burris    Good Morning, patient has a CT Scan scheduled for tomorrow, and we need to allow time for insurance approval, is the test medically urgent? if not medically urgent we may need to re-schedule to allow Insurance time to approve test. If you have any questions call ext [phone].  Thanks  [facility] Radiology    IF TEST IS NOT MEDICALLY URGENT, PLEASE RE-SCHEDULE PATIENT AT LEAST 3-4 DAYS OUT TO ALLOW TIME FOR INSURANCE TO PROCESS APPROVAL

## 2019-05-06 NOTE — TELEPHONE ENCOUNTER
----- Message from Sony Burger MD sent at 5/6/2019  2:36 PM CDT -----  Contact: RADIOLOGY  Yes  ----- Message -----  From: Mayte Neal MA  Sent: 5/6/2019   2:26 PM  To: Sony Burger MD    Would this be considered medically urgent?    ----- Message -----  From: Lisa Brand LPN  Sent: 5/6/2019   2:00 PM  To: Mayte Neal MA        ----- Message -----  From: RT Joseph  Sent: 5/6/2019   1:56 PM  To: Tao Burris    Good Morning, patient has a CT Scan scheduled for tomorrow, and we need to allow time for insurance approval, is the test medically urgent? if not medically urgent we may need to re-schedule to allow Insurance time to approve test. If you have any questions call ext [phone].  Thanks  [facility]    IF TEST IS NOT MEDICALLY URGENT, PLEASE RE-SCHEDULE PATIENT AT LEAST 3-4 DAYS OUT TO ALLOW TIME FOR INSURANCE TO PROCESS APPROVAL

## 2019-05-07 ENCOUNTER — HOSPITAL ENCOUNTER (OUTPATIENT)
Dept: RADIOLOGY | Facility: HOSPITAL | Age: 49
Discharge: HOME | End: 2019-05-07
Attending: SURGERY
Payer: OTHER MISCELLANEOUS

## 2019-05-07 DIAGNOSIS — N50.89 SCROTAL MASS: ICD-10-CM

## 2019-05-07 PROCEDURE — 72193 CT PELVIS W/DYE: CPT | Mod: TC

## 2019-05-07 PROCEDURE — 25500020 PHARM REV CODE 255: Performed by: SURGERY

## 2019-05-07 PROCEDURE — 72193 CT PELVIS WITH  CONTRAST: ICD-10-PCS | Mod: 26,,, | Performed by: RADIOLOGY

## 2019-05-07 PROCEDURE — 72193 CT PELVIS W/DYE: CPT | Mod: 26,,, | Performed by: RADIOLOGY

## 2019-05-07 RX ADMIN — IOHEXOL 75 ML: 350 INJECTION, SOLUTION INTRAVENOUS at 09:05

## 2019-05-07 RX ADMIN — IOHEXOL 30 ML: 350 INJECTION, SOLUTION INTRAVENOUS at 07:05

## 2019-05-08 ENCOUNTER — TELEPHONE (OUTPATIENT)
Dept: SURGERY | Facility: CLINIC | Age: 49
End: 2019-05-08

## 2019-05-08 NOTE — TELEPHONE ENCOUNTER
----- Message from Sony Burger MD sent at 5/8/2019  9:00 AM CDT -----  Regarding: appt  Needs appt to follow up in 1 month. Also we need form from workman's comp or job listing specific activities for his return to work light duty.  Otherwise we will have to issue a blanket statement specifying no strenuous activity including climbing carrying pushing or pulling heavy objects based on his job activities.  Thanks

## 2019-05-08 NOTE — TELEPHONE ENCOUNTER
CT results discussed with patient. Imaging shows postoperative seroma.  Discussed with patient that if this does not improve will have seroma aspirated.  Keep postop follow-up appointment.

## 2019-05-08 NOTE — TELEPHONE ENCOUNTER
----- Message from Yessenia Rangel sent at 5/8/2019  8:48 AM CDT -----  Contact: [phone]  Would like to consult with the nurse,patients declined to leave a message, please call back at [phone], thanks sj

## 2019-05-28 ENCOUNTER — TELEPHONE (OUTPATIENT)
Dept: SURGERY | Facility: CLINIC | Age: 49
End: 2019-05-28

## 2019-05-28 NOTE — TELEPHONE ENCOUNTER
----- Message from Queta Mcneill sent at 5/28/2019 12:25 PM CDT -----  Contact: self  Pt is calling to speak with nurse in regards to follow up appt with Dr. Burger. Pt was to receive call from office and has not as of yet. Please call pt back at [phone].      Thanks,   Queta Mcneill

## 2019-06-03 ENCOUNTER — OFFICE VISIT (OUTPATIENT)
Dept: SURGERY | Facility: CLINIC | Age: 49
End: 2019-06-03
Payer: OTHER MISCELLANEOUS

## 2019-06-03 VITALS
DIASTOLIC BLOOD PRESSURE: 96 MMHG | WEIGHT: 179.88 LBS | TEMPERATURE: 98 F | SYSTOLIC BLOOD PRESSURE: 160 MMHG | BODY MASS INDEX: 25.09 KG/M2 | HEART RATE: 71 BPM

## 2019-06-03 DIAGNOSIS — Z87.19 S/P REPAIR OF INGUINAL HERNIA: Primary | ICD-10-CM

## 2019-06-03 DIAGNOSIS — Z98.890 S/P REPAIR OF INGUINAL HERNIA: Primary | ICD-10-CM

## 2019-06-03 PROCEDURE — 99024 PR POST-OP FOLLOW-UP VISIT: ICD-10-PCS | Mod: S$GLB,,, | Performed by: SURGERY

## 2019-06-03 PROCEDURE — 99999 PR PBB SHADOW E&M-EST. PATIENT-LVL III: CPT | Mod: PBBFAC,,, | Performed by: SURGERY

## 2019-06-03 PROCEDURE — 99024 POSTOP FOLLOW-UP VISIT: CPT | Mod: S$GLB,,, | Performed by: SURGERY

## 2019-06-03 PROCEDURE — 99999 PR PBB SHADOW E&M-EST. PATIENT-LVL III: ICD-10-PCS | Mod: PBBFAC,,, | Performed by: SURGERY

## 2019-06-03 NOTE — LETTER
[facility] Surgery  [address] [address]  Phone: [phone]  Fax: [phone] Rita 3, 2019     Patient: Rajesh Swan   YOB: 1970   Date of Visit: 6/3/2019       To Whom It May Concern:    It is my medical opinion that Rajesh Swan may return to full duty immediately with no restrictions.    If you have any questions or concerns, please don't hesitate to contact my office.    Sincerely,          Sony Burger MD

## 2019-06-04 PROBLEM — K40.90 INGUINAL HERNIA: Status: RESOLVED | Noted: 2019-04-23 | Resolved: 2019-06-04

## 2019-06-04 NOTE — PROGRESS NOTES
History & Physical    SUBJECTIVE:     History of Present Illness:  Patient is a 48 y.o. male status post robotic bilateral inguinal hernia 7/5/1 9 presents for postop.  He reports noticing a bulge on his right groin.  It is not tender but continues to be present.    Initially referred for inguinal hernia.  Patient reports having bulge initially started his right groin with some discomfort but now has a bulge in his left groin as well. It does go down when he presses on it and lies flat.    Chief Complaint   Patient presents with    Post-op Evaluation     Post Op        Review of patient's allergies indicates:  No Known Allergies    Current Outpatient Medications   Medication Sig Dispense Refill    docusate sodium (COLACE) 100 MG capsule Take 1 capsule (100 mg total) by mouth 3 (three) times daily as needed for Constipation. 30 capsule 0    hydroCHLOROthiazide (HYDRODIURIL) 25 MG tablet Take 1 tablet (25 mg total) by mouth once daily. 30 tablet 0    ibuprofen (ADVIL,MOTRIN) 600 MG tablet Take 600 mg by mouth 2 (two) times daily as needed for Pain.      lisinopril (PRINIVIL,ZESTRIL) 40 MG tablet Take 1 tablet (40 mg total) by mouth once daily. 30 tablet 0    metoprolol succinate (TOPROL-XL) 50 MG 24 hr tablet Take 1 tablet (50 mg total) by mouth once daily. 30 tablet 0     No current facility-administered medications for this visit.        Past Medical History:   Diagnosis Date    Asthma     childhood    Depression     Hypertension     Narcotic abuse      Past Surgical History:   Procedure Laterality Date    XI ROBOTIC REPAIR, HERNIA, INGUINAL, BILATERAL Bilateral 4/23/2019    Performed by Sony Burger MD at [facility] OR     History reviewed. No pertinent family history.  Social History     Tobacco Use    Smoking status: Current Every Day Smoker     Packs/day: 0.50     Types: Cigarettes    Smokeless tobacco: Former User     Types: Chew    Tobacco comment: no tobacco  after m.n prior to sx   Substance Use  Topics    Alcohol use: Yes     Comment: socially  No alcohol 72h prior to sx    Drug use: No     Comment: Recovering narcotic addict        Review of Systems:  Review of Systems   Constitutional: Negative for chills, fever and unexpected weight change.   HENT: Negative for congestion.    Eyes: Negative for visual disturbance.   Respiratory: Negative for shortness of breath.    Cardiovascular: Negative for chest pain.   Gastrointestinal: Negative for abdominal distention, abdominal pain, constipation, nausea, rectal pain and vomiting.   Genitourinary: Negative for dysuria.   Musculoskeletal: Negative for arthralgias.   Skin: Negative for rash.   Neurological: Negative for light-headedness.   Hematological: Negative for adenopathy.       OBJECTIVE:     Vital Signs (Most Recent)  Temp: 98.3 °F (36.8 °C) (06/03/19 1305)  Pulse: 71 (06/03/19 1305)  BP: (!) 160/96 (06/03/19 1305)     81.6 kg (179 lb 14.3 oz)     Physical Exam:  Physical Exam   Constitutional: He is oriented to person, place, and time. He appears well-developed and well-nourished. No distress.   HENT:   Head: Normocephalic and atraumatic.   Eyes: EOM are normal.   Neck: Normal range of motion. Neck supple.   Cardiovascular: Normal rate and regular rhythm.   Pulmonary/Chest: Effort normal and breath sounds normal.   Abdominal: Soft. Bowel sounds are normal. He exhibits no distension. There is no tenderness. No hernia.   Well-healed surgical incisions across abdomen no signs of infection  Seroma, aspirated with straw colored fluid, small collection remains   Neurological: He is alert and oriented to person, place, and time.   Skin: Skin is warm and dry.   Vitals reviewed.    CT:  1. Fluid collection in the right inguinal/scrotal region most consistent with a seroma. No evidence to suggest a recurrent hernia.      ASSESSMENT/PLAN:     48-year-old male status post bilateral right inguinal hernia repair    PLAN:Plan     Well-healed  Seroma aspirated with with  significant decrease in size small palpable area remains of residual seroma  Patient will call in 1 month if fails to fully resolve for further aspiration

## 2020-01-08 ENCOUNTER — LAB VISIT (OUTPATIENT)
Dept: LAB | Facility: HOSPITAL | Age: 50
End: 2020-01-08
Attending: SURGERY

## 2020-01-08 ENCOUNTER — OFFICE VISIT (OUTPATIENT)
Dept: SURGERY | Facility: CLINIC | Age: 50
End: 2020-01-08
Payer: OTHER MISCELLANEOUS

## 2020-01-08 ENCOUNTER — CLINICAL SUPPORT (OUTPATIENT)
Dept: CARDIOLOGY | Facility: CLINIC | Age: 50
End: 2020-01-08
Payer: OTHER MISCELLANEOUS

## 2020-01-08 VITALS
WEIGHT: 181.44 LBS | SYSTOLIC BLOOD PRESSURE: 156 MMHG | HEIGHT: 71 IN | DIASTOLIC BLOOD PRESSURE: 101 MMHG | TEMPERATURE: 98 F | BODY MASS INDEX: 25.4 KG/M2 | HEART RATE: 75 BPM

## 2020-01-08 DIAGNOSIS — K40.91 RECURRENT INGUINAL HERNIA OF LEFT SIDE WITHOUT OBSTRUCTION OR GANGRENE: Primary | ICD-10-CM

## 2020-01-08 DIAGNOSIS — K40.91 RECURRENT INGUINAL HERNIA OF LEFT SIDE WITHOUT OBSTRUCTION OR GANGRENE: ICD-10-CM

## 2020-01-08 LAB
ALBUMIN SERPL BCP-MCNC: 3.9 G/DL (ref 3.5–5.2)
ALP SERPL-CCNC: 72 U/L (ref 55–135)
ALT SERPL W/O P-5'-P-CCNC: 39 U/L (ref 10–44)
ANION GAP SERPL CALC-SCNC: 9 MMOL/L (ref 8–16)
AST SERPL-CCNC: 30 U/L (ref 10–40)
BASOPHILS # BLD AUTO: 0.03 K/UL (ref 0–0.2)
BASOPHILS NFR BLD: 0.4 % (ref 0–1.9)
BILIRUB SERPL-MCNC: 0.3 MG/DL (ref 0.1–1)
BUN SERPL-MCNC: 14 MG/DL (ref 6–20)
CALCIUM SERPL-MCNC: 9.4 MG/DL (ref 8.7–10.5)
CHLORIDE SERPL-SCNC: 106 MMOL/L (ref 95–110)
CO2 SERPL-SCNC: 25 MMOL/L (ref 23–29)
CREAT SERPL-MCNC: 1.1 MG/DL (ref 0.5–1.4)
DIFFERENTIAL METHOD: NORMAL
EOSINOPHIL # BLD AUTO: 0.2 K/UL (ref 0–0.5)
EOSINOPHIL NFR BLD: 3 % (ref 0–8)
ERYTHROCYTE [DISTWIDTH] IN BLOOD BY AUTOMATED COUNT: 12.2 % (ref 11.5–14.5)
EST. GFR  (AFRICAN AMERICAN): >60 ML/MIN/1.73 M^2
EST. GFR  (NON AFRICAN AMERICAN): >60 ML/MIN/1.73 M^2
GLUCOSE SERPL-MCNC: 82 MG/DL (ref 70–110)
HCT VFR BLD AUTO: 50.1 % (ref 40–54)
HGB BLD-MCNC: 16.2 G/DL (ref 14–18)
IMM GRANULOCYTES # BLD AUTO: 0.01 K/UL (ref 0–0.04)
IMM GRANULOCYTES NFR BLD AUTO: 0.1 % (ref 0–0.5)
LYMPHOCYTES # BLD AUTO: 2.3 K/UL (ref 1–4.8)
LYMPHOCYTES NFR BLD: 29.7 % (ref 18–48)
MCH RBC QN AUTO: 29.7 PG (ref 27–31)
MCHC RBC AUTO-ENTMCNC: 32.3 G/DL (ref 32–36)
MCV RBC AUTO: 92 FL (ref 82–98)
MONOCYTES # BLD AUTO: 0.9 K/UL (ref 0.3–1)
MONOCYTES NFR BLD: 11 % (ref 4–15)
NEUTROPHILS # BLD AUTO: 4.3 K/UL (ref 1.8–7.7)
NEUTROPHILS NFR BLD: 55.8 % (ref 38–73)
NRBC BLD-RTO: 0 /100 WBC
PLATELET # BLD AUTO: 259 K/UL (ref 150–350)
PMV BLD AUTO: 10.9 FL (ref 9.2–12.9)
POTASSIUM SERPL-SCNC: 4.2 MMOL/L (ref 3.5–5.1)
PROT SERPL-MCNC: 8 G/DL (ref 6–8.4)
RBC # BLD AUTO: 5.46 M/UL (ref 4.6–6.2)
SODIUM SERPL-SCNC: 140 MMOL/L (ref 136–145)
WBC # BLD AUTO: 7.72 K/UL (ref 3.9–12.7)

## 2020-01-08 PROCEDURE — 99213 OFFICE O/P EST LOW 20 MIN: CPT | Mod: S$GLB,,, | Performed by: SURGERY

## 2020-01-08 PROCEDURE — 85025 COMPLETE CBC W/AUTO DIFF WBC: CPT

## 2020-01-08 PROCEDURE — 93005 ELECTROCARDIOGRAM TRACING: CPT | Mod: S$GLB,,, | Performed by: SURGERY

## 2020-01-08 PROCEDURE — 99213 PR OFFICE/OUTPT VISIT, EST, LEVL III, 20-29 MIN: ICD-10-PCS | Mod: S$GLB,,, | Performed by: SURGERY

## 2020-01-08 PROCEDURE — 93010 ELECTROCARDIOGRAM REPORT: CPT | Mod: S$GLB,,, | Performed by: INTERNAL MEDICINE

## 2020-01-08 PROCEDURE — 80053 COMPREHEN METABOLIC PANEL: CPT

## 2020-01-08 PROCEDURE — 36415 COLL VENOUS BLD VENIPUNCTURE: CPT

## 2020-01-08 PROCEDURE — 99999 PR PBB SHADOW E&M-EST. PATIENT-LVL IV: CPT | Mod: PBBFAC,,, | Performed by: SURGERY

## 2020-01-08 PROCEDURE — 93005 EKG 12-LEAD: ICD-10-PCS | Mod: S$GLB,,, | Performed by: SURGERY

## 2020-01-08 PROCEDURE — 93010 EKG 12-LEAD: ICD-10-PCS | Mod: S$GLB,,, | Performed by: INTERNAL MEDICINE

## 2020-01-08 PROCEDURE — 99999 PR PBB SHADOW E&M-EST. PATIENT-LVL IV: ICD-10-PCS | Mod: PBBFAC,,, | Performed by: SURGERY

## 2020-01-08 RX ORDER — SODIUM CHLORIDE 9 MG/ML
INJECTION, SOLUTION INTRAVENOUS CONTINUOUS
Status: CANCELLED | OUTPATIENT
Start: 2020-01-08

## 2020-01-08 RX ORDER — LIDOCAINE HYDROCHLORIDE 10 MG/ML
1 INJECTION, SOLUTION EPIDURAL; INFILTRATION; INTRACAUDAL; PERINEURAL ONCE
Status: CANCELLED | OUTPATIENT
Start: 2020-01-08 | End: 2020-01-08

## 2020-01-08 NOTE — PROGRESS NOTES
History & Physical    SUBJECTIVE:     History of Present Illness:  Patient is a 49 y.o. male status post robotic bilateral inguinal hernia 7/5/19 presents for left groin bulge.  He reports noticing a bulge that has progressively gotten bigger and more painful over the last month.  It will go down when he lies flat at night.    Initially referred for inguinal hernia.  Patient reports having bulge initially started his right groin with some discomfort but now has a bulge in his left groin as well. It does go down when he presses on it and lies flat.    Chief Complaint   Patient presents with    Follow-up       Review of patient's allergies indicates:  No Known Allergies    Current Outpatient Medications   Medication Sig Dispense Refill    docusate sodium (COLACE) 100 MG capsule Take 1 capsule (100 mg total) by mouth 3 (three) times daily as needed for Constipation. 30 capsule 0    hydroCHLOROthiazide (HYDRODIURIL) 25 MG tablet Take 1 tablet (25 mg total) by mouth once daily. 30 tablet 0    ibuprofen (ADVIL,MOTRIN) 600 MG tablet Take 600 mg by mouth 2 (two) times daily as needed for Pain.      lisinopril (PRINIVIL,ZESTRIL) 40 MG tablet Take 1 tablet (40 mg total) by mouth once daily. 30 tablet 0    metoprolol succinate (TOPROL-XL) 50 MG 24 hr tablet Take 1 tablet (50 mg total) by mouth once daily. 30 tablet 0     No current facility-administered medications for this visit.        Past Medical History:   Diagnosis Date    Asthma     childhood    Depression     Hypertension     Narcotic abuse      Past Surgical History:   Procedure Laterality Date    ROBOT-ASSISTED LAPAROSCOPIC REPAIR OF BILATERAL INGUINAL HERNIAS USING DA YENI XI Bilateral 4/23/2019    Procedure: XI ROBOTIC REPAIR, HERNIA, INGUINAL, BILATERAL;  Surgeon: Sony Burger MD;  Location: [facility];  Service: General;  Laterality: Bilateral;     History reviewed. No pertinent family history.  Social History     Tobacco Use    Smoking status: Current  "Every Day Smoker     Packs/day: 0.50     Types: Cigarettes    Smokeless tobacco: Former User     Types: Chew    Tobacco comment: no tobacco  after m.n prior to sx   Substance Use Topics    Alcohol use: Yes     Comment: socially  No alcohol 72h prior to sx    Drug use: No     Comment: Recovering narcotic addict        Review of Systems:  Review of Systems   Constitutional: Negative for chills, fever and unexpected weight change.   HENT: Negative for congestion.    Eyes: Negative for visual disturbance.   Respiratory: Negative for shortness of breath.    Cardiovascular: Negative for chest pain.   Gastrointestinal: Negative for abdominal distention, abdominal pain, constipation, nausea, rectal pain and vomiting.   Genitourinary: Negative for dysuria.   Musculoskeletal: Negative for arthralgias.   Skin: Negative for rash.   Neurological: Negative for light-headedness.   Hematological: Negative for adenopathy.       OBJECTIVE:     Vital Signs (Most Recent)  Temp: 97.6 °F (36.4 °C) (01/08/20 1113)  Pulse: 75 (01/08/20 1113)  BP: (!) 156/101 (01/08/20 1113)  5' 11" (1.803 m)  82.3 kg (181 lb 7 oz)     Physical Exam:  Physical Exam   Constitutional: He is oriented to person, place, and time. He appears well-developed and well-nourished. No distress.   HENT:   Head: Normocephalic and atraumatic.   Eyes: EOM are normal.   Neck: Normal range of motion. Neck supple.   Cardiovascular: Normal rate and regular rhythm.   Pulmonary/Chest: Effort normal and breath sounds normal.   Abdominal: Soft. Bowel sounds are normal. He exhibits no distension. There is no tenderness. A hernia (Left recurrent inguinal hernia partially reducible) is present.   Well-healed surgical incisions across abdomen no signs of infection     Neurological: He is alert and oriented to person, place, and time.   Skin: Skin is warm and dry.   Vitals reviewed.    CT:  1. Fluid collection in the right inguinal/scrotal region most consistent with a seroma. No " evidence to suggest a recurrent hernia.      ASSESSMENT/PLAN:     48-year-old male status post bilateral right inguinal hernia repair, with recurrent left inguinal hernia    PLAN:Plan     Left inguinal hernia repair with mesh 1/17/20  Preop:  CBC, CMP, EKG  Discussed risks and benefits of inguinal hernia repair including but not limited to:  Pain, bleeding, and infection, injury to spermatic cord, testicular atrophy or loss, nerve pain which may be long lasting, recurrence, need for further surgery

## 2020-01-08 NOTE — H&P (VIEW-ONLY)
History & Physical    SUBJECTIVE:     History of Present Illness:  Patient is a 49 y.o. male status post robotic bilateral inguinal hernia 7/5/19 presents for left groin bulge.  He reports noticing a bulge that has progressively gotten bigger and more painful over the last month.  It will go down when he lies flat at night.    Initially referred for inguinal hernia.  Patient reports having bulge initially started his right groin with some discomfort but now has a bulge in his left groin as well. It does go down when he presses on it and lies flat.    Chief Complaint   Patient presents with    Follow-up       Review of patient's allergies indicates:  No Known Allergies    Current Outpatient Medications   Medication Sig Dispense Refill    docusate sodium (COLACE) 100 MG capsule Take 1 capsule (100 mg total) by mouth 3 (three) times daily as needed for Constipation. 30 capsule 0    hydroCHLOROthiazide (HYDRODIURIL) 25 MG tablet Take 1 tablet (25 mg total) by mouth once daily. 30 tablet 0    ibuprofen (ADVIL,MOTRIN) 600 MG tablet Take 600 mg by mouth 2 (two) times daily as needed for Pain.      lisinopril (PRINIVIL,ZESTRIL) 40 MG tablet Take 1 tablet (40 mg total) by mouth once daily. 30 tablet 0    metoprolol succinate (TOPROL-XL) 50 MG 24 hr tablet Take 1 tablet (50 mg total) by mouth once daily. 30 tablet 0     No current facility-administered medications for this visit.        Past Medical History:   Diagnosis Date    Asthma     childhood    Depression     Hypertension     Narcotic abuse      Past Surgical History:   Procedure Laterality Date    ROBOT-ASSISTED LAPAROSCOPIC REPAIR OF BILATERAL INGUINAL HERNIAS USING DA YENI XI Bilateral 4/23/2019    Procedure: XI ROBOTIC REPAIR, HERNIA, INGUINAL, BILATERAL;  Surgeon: Sony Burger MD;  Location: [facility] ER;  Service: General;  Laterality: Bilateral;     History reviewed. No pertinent family history.  Social History     Tobacco Use    Smoking status: Current  "Every Day Smoker     Packs/day: 0.50     Types: Cigarettes    Smokeless tobacco: Former User     Types: Chew    Tobacco comment: no tobacco  after m.n prior to sx   Substance Use Topics    Alcohol use: Yes     Comment: socially  No alcohol 72h prior to sx    Drug use: No     Comment: Recovering narcotic addict        Review of Systems:  Review of Systems   Constitutional: Negative for chills, fever and unexpected weight change.   HENT: Negative for congestion.    Eyes: Negative for visual disturbance.   Respiratory: Negative for shortness of breath.    Cardiovascular: Negative for chest pain.   Gastrointestinal: Negative for abdominal distention, abdominal pain, constipation, nausea, rectal pain and vomiting.   Genitourinary: Negative for dysuria.   Musculoskeletal: Negative for arthralgias.   Skin: Negative for rash.   Neurological: Negative for light-headedness.   Hematological: Negative for adenopathy.       OBJECTIVE:     Vital Signs (Most Recent)  Temp: 97.6 °F (36.4 °C) (01/08/20 1113)  Pulse: 75 (01/08/20 1113)  BP: (!) 156/101 (01/08/20 1113)  5' 11" (1.803 m)  82.3 kg (181 lb 7 oz)     Physical Exam:  Physical Exam   Constitutional: He is oriented to person, place, and time. He appears well-developed and well-nourished. No distress.   HENT:   Head: Normocephalic and atraumatic.   Eyes: EOM are normal.   Neck: Normal range of motion. Neck supple.   Cardiovascular: Normal rate and regular rhythm.   Pulmonary/Chest: Effort normal and breath sounds normal.   Abdominal: Soft. Bowel sounds are normal. He exhibits no distension. There is no tenderness. A hernia (Left recurrent inguinal hernia partially reducible) is present.   Well-healed surgical incisions across abdomen no signs of infection     Neurological: He is alert and oriented to person, place, and time.   Skin: Skin is warm and dry.   Vitals reviewed.    CT:  1. Fluid collection in the right inguinal/scrotal region most consistent with a seroma. No " evidence to suggest a recurrent hernia.      ASSESSMENT/PLAN:     48-year-old male status post bilateral right inguinal hernia repair, with recurrent left inguinal hernia    PLAN:Plan     Left inguinal hernia repair with mesh 1/17/20  Preop:  CBC, CMP, EKG  Discussed risks and benefits of inguinal hernia repair including but not limited to:  Pain, bleeding, and infection, injury to spermatic cord, testicular atrophy or loss, nerve pain which may be long lasting, recurrence, need for further surgery

## 2020-01-10 ENCOUNTER — TELEPHONE (OUTPATIENT)
Dept: SURGERY | Facility: CLINIC | Age: 50
End: 2020-01-10

## 2020-01-10 NOTE — TELEPHONE ENCOUNTER
----- Message from Gabriela Rangel sent at 1/10/2020 11:42 AM CST -----  Hello. I just received a call from this patient  regarding his surgery that is being requested through work comp. They need the cpt code for the procedure and they also need to confirm is the surgery being performed on the right or left side. Please let me know as soon as possible, so this request can get approved.       Thank you.

## 2020-01-15 NOTE — PRE ADMISSION SCREENING
Pre op instructions reviewed with patient per phone:    To confirm, Your surgeon has instructed you:  Surgery is scheduled 01/17/20 at 1215.      Please report to [facility] 1st floor main lobby by 1045.   Pre admit office to call afternoon prior to surgery with final arrival time      INSTRUCTIONS IMPORTANT!!!  ¨ Do not eat, drink, or smoke after 12 midnight-including water. OK to brush teeth, no gum, candy or mints!    ¨ Take only these medicines with a small swallow of water-morning of surgery.  metroprolol  ____  Do not wear makeup, including mascara.  ____  No powder, lotions or creams to surgical area.  ____  Please remove all jewelry, including piercings and leave at home.  ____  No money or valuables needed. Please leave at home.  ____  Please bring identification and insurance information to hospital.  ____  If going home the same day, arrange for a ride home. You will not be able to   drive if Anesthesia was used.  ____  Children, under 12 years old, must remain in the waiting room with an adult.  They are not allowed in patient areas.  ____  Wear loose fitting clothing. Allow for dressings, bandages.  ____  Stop Aspirin, Ibuprofen, Motrin and Aleve at least 5-7 days before surgery, unless otherwise instructed by your doctor, or the nurse.   You MAY use Tylenol/acetaminophen until day of surgery.  ____  If you take diabetic medication, do not take am of surgery unless instructed by   Doctor.  ____ Stop taking any Fish Oil supplement or any Vitamins that contain Vitamin E at least 5 days prior to surgery.          Bathing Instructions-- The night before surgery and the morning prior to coming to the hospital:   -Do not shave the surgical area.   -Shower and wash your hair and body as usual with anti-bacterial  soap and shampoo.   -Rinse your hair and body completely.   -Use one packet of hibiclens to wash the surgical site (using your hand) gently for 5 minutes.  Do not scrub you  skin too hard.   -Do not use hibiclens on your head, face, or genitals.   -Do not wash with anti-bacterial soap after you use the hibiclens.   -Rinse your body thoroughly.   -Dry with clean, soft towel.  Do not use lotion, cream, deodorant, or powders on   the surgical site.    Use antibacterial soap in place of hibiclens if your surgery is on the head, face or genitals.         Surgical Site Infection    Prevention of surgical site infections:     -Keep incisions clean and dry.   -Do not soak/submerge incisions in water until completely healed.   -Do not apply lotions, powders, creams, or deodorants to site.   -Always make sure hands are cleaned with antibacterial soap/ alcohol-based   prior to touching the surgical site.  (This includes doctors, nurses, staff, and yourself.)    Signs and symptoms:   -Redness and pain around the area where you had surgery   -Drainage of cloudy fluid from your surgical wound   -Fever over 100.4  I have read or had read and explained to me, and understand the above information.

## 2020-01-17 ENCOUNTER — ANESTHESIA EVENT (OUTPATIENT)
Dept: SURGERY | Facility: HOSPITAL | Age: 50
End: 2020-01-17
Payer: OTHER MISCELLANEOUS

## 2020-01-17 ENCOUNTER — ANESTHESIA (OUTPATIENT)
Dept: SURGERY | Facility: HOSPITAL | Age: 50
End: 2020-01-17
Payer: OTHER MISCELLANEOUS

## 2020-01-17 ENCOUNTER — HOSPITAL ENCOUNTER (OUTPATIENT)
Facility: HOSPITAL | Age: 50
Discharge: HOME | End: 2020-01-17
Attending: SURGERY | Admitting: SURGERY
Payer: OTHER MISCELLANEOUS

## 2020-01-17 DIAGNOSIS — K40.91 RECURRENT INGUINAL HERNIA: ICD-10-CM

## 2020-01-17 DIAGNOSIS — K40.91 RECURRENT INGUINAL HERNIA OF LEFT SIDE WITHOUT OBSTRUCTION OR GANGRENE: ICD-10-CM

## 2020-01-17 PROCEDURE — 37000008 HC ANESTHESIA 1ST 15 MINUTES: Performed by: SURGERY

## 2020-01-17 PROCEDURE — 25000003 PHARM REV CODE 250: Performed by: NURSE ANESTHETIST, CERTIFIED REGISTERED

## 2020-01-17 PROCEDURE — 25000003 PHARM REV CODE 250: Performed by: SURGERY

## 2020-01-17 PROCEDURE — 63600175 PHARM REV CODE 636 W HCPCS: Performed by: ANESTHESIOLOGY

## 2020-01-17 PROCEDURE — 37000009 HC ANESTHESIA EA ADD 15 MINS: Performed by: SURGERY

## 2020-01-17 PROCEDURE — 71000015 HC POSTOP RECOV 1ST HR: Performed by: SURGERY

## 2020-01-17 PROCEDURE — 63600175 PHARM REV CODE 636 W HCPCS: Performed by: NURSE ANESTHETIST, CERTIFIED REGISTERED

## 2020-01-17 PROCEDURE — 25000003 PHARM REV CODE 250: Performed by: ANESTHESIOLOGY

## 2020-01-17 PROCEDURE — 36000707: Performed by: SURGERY

## 2020-01-17 PROCEDURE — 49520 REREPAIR ING HERNIA REDUCE: CPT | Mod: LT,,, | Performed by: SURGERY

## 2020-01-17 PROCEDURE — C1729 CATH, DRAINAGE: HCPCS | Performed by: SURGERY

## 2020-01-17 PROCEDURE — 36000706: Performed by: SURGERY

## 2020-01-17 PROCEDURE — 71000033 HC RECOVERY, INTIAL HOUR: Performed by: SURGERY

## 2020-01-17 PROCEDURE — C1781 MESH (IMPLANTABLE): HCPCS | Performed by: SURGERY

## 2020-01-17 PROCEDURE — 49520 PR REPAIR RECURR INGUIN HERN,REDUCIBL: ICD-10-PCS | Mod: LT,,, | Performed by: SURGERY

## 2020-01-17 DEVICE — PLUG MEDIUM: Type: IMPLANTABLE DEVICE | Site: GROIN | Status: FUNCTIONAL

## 2020-01-17 RX ORDER — BUPIVACAINE HYDROCHLORIDE 2.5 MG/ML
INJECTION, SOLUTION EPIDURAL; INFILTRATION; INTRACAUDAL
Status: DISCONTINUED | OUTPATIENT
Start: 2020-01-17 | End: 2020-01-17 | Stop reason: HOSPADM

## 2020-01-17 RX ORDER — ONDANSETRON 2 MG/ML
4 INJECTION INTRAMUSCULAR; INTRAVENOUS EVERY 8 HOURS PRN
Status: DISCONTINUED | OUTPATIENT
Start: 2020-01-17 | End: 2020-01-17 | Stop reason: HOSPADM

## 2020-01-17 RX ORDER — LIDOCAINE HCL/PF 100 MG/5ML
SYRINGE (ML) INTRAVENOUS
Status: DISCONTINUED | OUTPATIENT
Start: 2020-01-17 | End: 2020-01-17

## 2020-01-17 RX ORDER — ROCURONIUM BROMIDE 10 MG/ML
INJECTION, SOLUTION INTRAVENOUS
Status: DISCONTINUED | OUTPATIENT
Start: 2020-01-17 | End: 2020-01-17

## 2020-01-17 RX ORDER — LIDOCAINE HYDROCHLORIDE 10 MG/ML
1 INJECTION, SOLUTION EPIDURAL; INFILTRATION; INTRACAUDAL; PERINEURAL ONCE
Status: DISCONTINUED | OUTPATIENT
Start: 2020-01-17 | End: 2020-01-17 | Stop reason: HOSPADM

## 2020-01-17 RX ORDER — GLYCOPYRROLATE 0.2 MG/ML
INJECTION INTRAMUSCULAR; INTRAVENOUS
Status: DISCONTINUED | OUTPATIENT
Start: 2020-01-17 | End: 2020-01-17

## 2020-01-17 RX ORDER — FENTANYL CITRATE 50 UG/ML
INJECTION, SOLUTION INTRAMUSCULAR; INTRAVENOUS
Status: DISCONTINUED | OUTPATIENT
Start: 2020-01-17 | End: 2020-01-17

## 2020-01-17 RX ORDER — CEFAZOLIN SODIUM 2 G/50ML
2 SOLUTION INTRAVENOUS
Status: DISCONTINUED | OUTPATIENT
Start: 2020-01-17 | End: 2020-01-17 | Stop reason: HOSPADM

## 2020-01-17 RX ORDER — HYDROMORPHONE HYDROCHLORIDE 2 MG/ML
0.5 INJECTION, SOLUTION INTRAMUSCULAR; INTRAVENOUS; SUBCUTANEOUS EVERY 5 MIN PRN
Status: COMPLETED | OUTPATIENT
Start: 2020-01-17 | End: 2020-01-17

## 2020-01-17 RX ORDER — HYDROCODONE BITARTRATE AND ACETAMINOPHEN 5; 325 MG/1; MG/1
1 TABLET ORAL EVERY 4 HOURS PRN
Status: DISCONTINUED | OUTPATIENT
Start: 2020-01-17 | End: 2020-01-17 | Stop reason: HOSPADM

## 2020-01-17 RX ORDER — HYDROCODONE BITARTRATE AND ACETAMINOPHEN 10; 325 MG/1; MG/1
1 TABLET ORAL EVERY 4 HOURS PRN
Qty: 40 TABLET | Refills: 0 | Status: SHIPPED | OUTPATIENT
Start: 2020-01-17 | End: 2020-01-24 | Stop reason: SDUPTHER

## 2020-01-17 RX ORDER — NEOSTIGMINE METHYLSULFATE 1 MG/ML
INJECTION, SOLUTION INTRAVENOUS
Status: DISCONTINUED | OUTPATIENT
Start: 2020-01-17 | End: 2020-01-17

## 2020-01-17 RX ORDER — CEFAZOLIN SODIUM 1 G/3ML
INJECTION, POWDER, FOR SOLUTION INTRAMUSCULAR; INTRAVENOUS
Status: DISCONTINUED | OUTPATIENT
Start: 2020-01-17 | End: 2020-01-17

## 2020-01-17 RX ORDER — ONDANSETRON 2 MG/ML
INJECTION INTRAMUSCULAR; INTRAVENOUS
Status: DISCONTINUED | OUTPATIENT
Start: 2020-01-17 | End: 2020-01-17

## 2020-01-17 RX ORDER — PROPOFOL 10 MG/ML
VIAL (ML) INTRAVENOUS
Status: DISCONTINUED | OUTPATIENT
Start: 2020-01-17 | End: 2020-01-17

## 2020-01-17 RX ORDER — IBUPROFEN 800 MG/1
800 TABLET ORAL 3 TIMES DAILY
Qty: 21 TABLET | Refills: 0 | Status: SHIPPED | OUTPATIENT
Start: 2020-01-17 | End: 2020-01-24 | Stop reason: SDUPTHER

## 2020-01-17 RX ORDER — ONDANSETRON 2 MG/ML
4 INJECTION INTRAMUSCULAR; INTRAVENOUS DAILY PRN
Status: DISCONTINUED | OUTPATIENT
Start: 2020-01-17 | End: 2020-01-17 | Stop reason: HOSPADM

## 2020-01-17 RX ORDER — FENTANYL CITRATE 50 UG/ML
25 INJECTION, SOLUTION INTRAMUSCULAR; INTRAVENOUS EVERY 5 MIN PRN
Status: COMPLETED | OUTPATIENT
Start: 2020-01-17 | End: 2020-01-17

## 2020-01-17 RX ORDER — OXYCODONE AND ACETAMINOPHEN 10; 325 MG/1; MG/1
1 TABLET ORAL EVERY 4 HOURS PRN
Status: DISCONTINUED | OUTPATIENT
Start: 2020-01-17 | End: 2020-01-17 | Stop reason: HOSPADM

## 2020-01-17 RX ORDER — SODIUM CHLORIDE, SODIUM LACTATE, POTASSIUM CHLORIDE, CALCIUM CHLORIDE 600; 310; 30; 20 MG/100ML; MG/100ML; MG/100ML; MG/100ML
INJECTION, SOLUTION INTRAVENOUS CONTINUOUS PRN
Status: DISCONTINUED | OUTPATIENT
Start: 2020-01-17 | End: 2020-01-17

## 2020-01-17 RX ORDER — LIDOCAINE HYDROCHLORIDE 10 MG/ML
INJECTION, SOLUTION EPIDURAL; INFILTRATION; INTRACAUDAL; PERINEURAL
Status: DISCONTINUED | OUTPATIENT
Start: 2020-01-17 | End: 2020-01-17 | Stop reason: HOSPADM

## 2020-01-17 RX ORDER — SODIUM CHLORIDE 9 MG/ML
INJECTION, SOLUTION INTRAVENOUS CONTINUOUS
Status: DISCONTINUED | OUTPATIENT
Start: 2020-01-17 | End: 2020-01-17 | Stop reason: HOSPADM

## 2020-01-17 RX ORDER — MORPHINE SULFATE 4 MG/ML
3 INJECTION, SOLUTION INTRAMUSCULAR; INTRAVENOUS
Status: DISCONTINUED | OUTPATIENT
Start: 2020-01-17 | End: 2020-01-17 | Stop reason: HOSPADM

## 2020-01-17 RX ORDER — MIDAZOLAM HYDROCHLORIDE 1 MG/ML
INJECTION, SOLUTION INTRAMUSCULAR; INTRAVENOUS
Status: DISCONTINUED | OUTPATIENT
Start: 2020-01-17 | End: 2020-01-17

## 2020-01-17 RX ADMIN — PROPOFOL 150 MG: 10 INJECTION, EMULSION INTRAVENOUS at 11:01

## 2020-01-17 RX ADMIN — FENTANYL CITRATE 25 MCG: 50 INJECTION, SOLUTION INTRAMUSCULAR; INTRAVENOUS at 01:01

## 2020-01-17 RX ADMIN — OXYCODONE HYDROCHLORIDE AND ACETAMINOPHEN 1 TABLET: 10; 325 TABLET ORAL at 01:01

## 2020-01-17 RX ADMIN — PROPOFOL 50 MG: 10 INJECTION, EMULSION INTRAVENOUS at 12:01

## 2020-01-17 RX ADMIN — SODIUM CHLORIDE, SODIUM LACTATE, POTASSIUM CHLORIDE, AND CALCIUM CHLORIDE: 600; 310; 30; 20 INJECTION, SOLUTION INTRAVENOUS at 11:01

## 2020-01-17 RX ADMIN — HYDROMORPHONE HYDROCHLORIDE 0.5 MG: 2 INJECTION INTRAMUSCULAR; INTRAVENOUS; SUBCUTANEOUS at 01:01

## 2020-01-17 RX ADMIN — LIDOCAINE HYDROCHLORIDE 100 MG: 20 INJECTION, SOLUTION INTRAVENOUS at 11:01

## 2020-01-17 RX ADMIN — HYDROMORPHONE HYDROCHLORIDE 0.5 MG: 2 INJECTION INTRAMUSCULAR; INTRAVENOUS; SUBCUTANEOUS at 02:01

## 2020-01-17 RX ADMIN — ROBINUL 0.4 MG: 0.2 INJECTION INTRAMUSCULAR; INTRAVENOUS at 12:01

## 2020-01-17 RX ADMIN — FENTANYL CITRATE 100 MCG: 50 INJECTION, SOLUTION INTRAMUSCULAR; INTRAVENOUS at 12:01

## 2020-01-17 RX ADMIN — CEFAZOLIN 2 G: 1 INJECTION, POWDER, FOR SOLUTION INTRAMUSCULAR; INTRAVENOUS at 11:01

## 2020-01-17 RX ADMIN — FENTANYL CITRATE 250 MCG: 50 INJECTION, SOLUTION INTRAMUSCULAR; INTRAVENOUS at 11:01

## 2020-01-17 RX ADMIN — ONDANSETRON 4 MG: 2 INJECTION, SOLUTION INTRAMUSCULAR; INTRAVENOUS at 12:01

## 2020-01-17 RX ADMIN — ROCURONIUM BROMIDE 50 MG: 10 INJECTION, SOLUTION INTRAVENOUS at 11:01

## 2020-01-17 RX ADMIN — NEOSTIGMINE METHYLSULFATE 3 MG: 1 INJECTION INTRAVENOUS at 12:01

## 2020-01-17 RX ADMIN — MIDAZOLAM 2 MG: 1 INJECTION INTRAMUSCULAR; INTRAVENOUS at 11:01

## 2020-01-17 NOTE — INTERVAL H&P NOTE
The patient has been examined and the H&P has been reviewed:    I concur with the findings and no changes have occurred since H&P was written.    Anesthesia/Surgery risks, benefits and alternative options discussed and understood by patient/family.          Active Hospital Problems    Diagnosis  POA    Recurrent inguinal hernia [K40.91]  Yes      Resolved Hospital Problems   No resolved problems to display.

## 2020-01-17 NOTE — ANESTHESIA PREPROCEDURE EVALUATION
01/17/2020  Rajesh Swan is a 49 y.o., male.    Anesthesia Evaluation    I have reviewed the Patient Summary Reports.    I have reviewed the Nursing Notes.   I have reviewed the Medications.     Review of Systems  Anesthesia Hx:  No problems with previous Anesthesia Denies Hx of Anesthetic complications  Denies Family Hx of Anesthesia complications.   Denies Personal Hx of Anesthesia complications.   Social:  Non-Smoker, No Alcohol Use    Cardiovascular:   Hypertension ECG has been reviewed.    Pulmonary:   Asthma    Renal/:  Renal/ Normal     Hepatic/GI:  Hepatic/GI Normal    Neurological:  Neurology Normal    Endocrine:  Endocrine Normal    Psych:   Psychiatric History depression        Patient Active Problem List   Diagnosis    Recurrent inguinal hernia         Physical Exam  General:  Well nourished    Airway/Jaw/Neck:  Airway Findings: Mouth Opening: Normal Tongue: Normal  General Airway Assessment: Adult  Mallampati: II  TM Distance: 4 - 6 cm  Jaw/Neck Findings:  Neck ROM: Normal ROM      Dental:  Dental Findings: In tact   Chest/Lungs:  Chest/Lungs Findings: Clear to auscultation, Normal Respiratory Rate     Heart/Vascular:  Heart Findings: Rate: Normal  Rhythm: Regular Rhythm  Sounds: Normal        Mental Status:  Mental Status Findings:  Cooperative, Alert and Oriented       Lab Results   Component Value Date    WBC 7.72 01/08/2020    HGB 16.2 01/08/2020    HCT 50.1 01/08/2020    MCV 92 01/08/2020     01/08/2020         Chemistry        Component Value Date/Time     01/08/2020 1237    K 4.2 01/08/2020 1237     01/08/2020 1237    CO2 25 01/08/2020 1237    BUN 14 01/08/2020 1237    CREATININE 1.1 01/08/2020 1237    GLU 82 01/08/2020 1237        Component Value Date/Time    CALCIUM 9.4 01/08/2020 1237    ALKPHOS 72 01/08/2020 1237    AST 30 01/08/2020 1237    ALT 39  01/08/2020 1237    BILITOT 0.3 01/08/2020 1237    ESTGFRAFRICA >60.0 01/08/2020 1237    EGFRNONAA >60.0 01/08/2020 1237            Anesthesia Plan  Type of Anesthesia, risks & benefits discussed:  Anesthesia Type:  general  Patient's Preference:   Intra-op Monitoring Plan: standard ASA monitors  Intra-op Monitoring Plan Comments:   Post Op Pain Control Plan: per primary service following discharge from PACU  Post Op Pain Control Plan Comments:   Induction:   IV  Beta Blocker:  Patient is not currently on a Beta-Blocker (No further documentation required).       Informed Consent: Patient understands risks and agrees with Anesthesia plan.  Questions answered. Anesthesia consent signed with patient.  ASA Score: 2     Day of Surgery Review of History & Physical: I have interviewed and examined the patient. I have reviewed the patient's H&P dated:  There are no significant changes.  H&P update referred to the surgeon.         Ready For Surgery From Anesthesia Perspective.

## 2020-01-17 NOTE — PLAN OF CARE
Pt AAOX3. Pre-op checklist done. Side effects of anesthesia and expectations during recovery discussed with pt and faye. Both verbalized understanding. Questions answered. Will cont to monitor.

## 2020-01-17 NOTE — ANESTHESIA RELEASE NOTE
"Anesthesia Release from PACU Note    Patient: Rajesh Swan    Procedure(s) Performed: Procedure(s) (LRB):  REPAIR, HERNIA, INGUINAL, RECURRENT (Left)    Anesthesia type: general    Post pain: Adequate analgesia    Post assessment: no apparent anesthetic complications    Last Vitals:   Visit Vitals  BP (!) 159/92   Pulse 74   Temp 36.7 °C (98 °F) (Temporal)   Resp 18   Ht 5' 11" (1.803 m)   Wt 80.1 kg (176 lb 9.4 oz)   SpO2 98%   BMI 24.63 kg/m²       Post vital signs: stable    Level of consciousness: awake    Nausea/Vomiting: no nausea/no vomiting    Complications: none    Airway Patency: patent    Respiratory: unassisted, room air    Cardiovascular: stable and blood pressure at baseline    Hydration: euvolemic  "

## 2020-01-17 NOTE — ANESTHESIA POSTPROCEDURE EVALUATION
Anesthesia Post Evaluation    Patient: Rajesh Swan    Procedure(s) Performed: Procedure(s) (LRB):  REPAIR, HERNIA, INGUINAL, RECURRENT (Left)    Final Anesthesia Type: general    Patient location during evaluation: PACU  Patient participation: Yes- Able to Participate  Level of consciousness: awake and alert  Post-procedure vital signs: reviewed and stable  Pain management: adequate  Airway patency: patent    PONV status at discharge: No PONV  Anesthetic complications: no      Cardiovascular status: blood pressure returned to baseline  Respiratory status: unassisted  Hydration status: euvolemic  Follow-up not needed.          Vitals Value Taken Time   /92 1/17/2020  2:10 PM   Temp 36.7 °C (98 °F) 1/17/2020  2:10 PM   Pulse 74 1/17/2020  2:10 PM   Resp 18 1/17/2020  2:10 PM   SpO2 98 % 1/17/2020  2:10 PM         Event Time     Out of Recovery 14:14:17          Pain/Maikel Score: Pain Rating Prior to Med Admin: 6 (1/17/2020  2:10 PM)  Maikel Score: 10 (1/17/2020  2:00 PM)

## 2020-01-17 NOTE — OP NOTE
[facility]  Surgery Department  Operative Note    SUMMARY     Date of Procedure: 1/17/2020     Procedure: Procedure(s) (LRB):  REPAIR, HERNIA, INGUINAL, RECURRENT (Left)     Surgeon(s) and Role:     * Sony Burger MD - Primary    Assisting Surgeon: None    Pre-Operative Diagnosis: Recurrent inguinal hernia of left side without obstruction or gangrene [K40.91]    Post-Operative Diagnosis: Post-Op Diagnosis Codes:     * Recurrent inguinal hernia of left side without obstruction or gangrene [K40.91]    Anesthesia: General    Technical Procedures Used:  Left inguinal hernia repair with mesh    Description of the Findings of the Procedure:  Indirect left inguinal hernia repair    Complications: No    Estimated Blood Loss (EBL):  10 cc           Implants:   Implant Name Type Inv. Item Serial No.  Lot No. LRB No. Used   PLUG MEDIUM - [id number]  [employer]  [employer] [id number] 1       Specimens:   Specimen (12h ago, onward)    None                  Condition: Good    Disposition: PACU - hemodynamically stable.    Procedure in detail:  The patient was identified in preoperative holding and brought back to the Operating Room. The patient was placed supine on the operating table and padded appropriately. Monitors were applied and monitored anesthesia care was initiated. His left groin was shaved with electric clippers   and prepped and draped in the standard sterile surgical fashion. A timeout was   performed and all team members present agreed this was the correct procedure on   the correct side of the correct patient. We also confirmed administration of   appropriate preoperative antibiotics.      We marked out an appropriate left inguinal incision and administered a mix of   lidocaine and Marcaine. After assuring adequate local anesthesia, a 5 cm   oblique skin incision was made. Subcutaneous tissue was divided with Bovie   electrocautery. This dissection was carried down through  Manuel fascia down to   the aponeurosis of the external oblique. The aponeurosis of the external   oblique was incised in line with its fibers, first with a scalpel and then   Metzenbaum scissors, and this incision was extended to the external spermatic   ring. The inguinal canal contents were bluntly encircled, first digitally and   then with a Penrose drain. Weitlaner retractors were placed to facilitate the   dissection. We proceeded to identify an indirect inguinal hernia sac     which was carefully dissected away from the inguinal canal contents until it   could be reduced into the defect. We performed skeletonization of the spermatic cord. We then had appropriate borders of the inguinal canal identified and decided to repair the defect with medium mesh plug and patch. The plug was placed into the direct defect and sutured to the fascia medially overlying the pubic tubercle the shelving edge and conjoint tendon.  The mesh was then cut to fit the defect appropriately and sewn in place with interrupted 0 Nurolon suture. Medially, the mesh was anchored to the fascia overlying the pubic tubercle. Inferiorly, the mesh was anchored to the shelving edge of the inguinal ligament. Superiorly, the mesh was anchored to the conjoined tendon. The tails of the mesh were brought together around the cord structures creating a new internal ring that just admitted the tip of the   finger. The mesh was inspected and noted to lie in appropriate position without   any redundancy or tension. The testicle was pulled back down to the scrotum   and there was noted to be no tension on the cord structures. The aponeurosis of   the external oblique was closed with a running 3-0 Vicryl suture. Manuel   fascia was closed with several buried interrupted 3-0 Vicryl sutures and the   skin was closed with a running subcuticular Monocryl suture. A sterile dressing   was applied.

## 2020-01-17 NOTE — BRIEF OP NOTE
[facility]  Brief Operative Note    Surgery Date: 1/17/2020     Surgeon(s) and Role:     * Sony Burger MD - Primary    Assisting Surgeon: None    Pre-op Diagnosis:  Recurrent inguinal hernia of left side without obstruction or gangrene [K40.91]    Post-op Diagnosis:  Post-Op Diagnosis Codes:     * Recurrent inguinal hernia of left side without obstruction or gangrene [K40.91]    Procedure(s) (LRB):  REPAIR, HERNIA, INGUINAL, RECURRENT (Left)    Anesthesia: General    Description of the findings of the procedure(s):  Left inguinal hernia repair    Estimated Blood Loss: * No values recorded between 1/17/2020 11:53 AM and 1/17/2020  1:14 PM *         Specimens:   Specimen (12h ago, onward)    None            Discharge Note    OUTCOME: Patient tolerated treatment/procedure well without complication and is now ready for discharge.    DISPOSITION: Home or Self Care    FINAL DIAGNOSIS:  Recurrent left inguinal hernia    FOLLOWUP: In clinic

## 2020-01-17 NOTE — TRANSFER OF CARE
"Anesthesia Transfer of Care Note    Patient: Rajesh Swan    Procedure(s) Performed: Procedure(s) (LRB):  REPAIR, HERNIA, INGUINAL, RECURRENT (Left)    Patient location: PACU    Anesthesia Type: general    Transport from OR: Transported from OR on room air with adequate spontaneous ventilation    Post pain: adequate analgesia    Post assessment: no apparent anesthetic complications    Post vital signs: stable    Level of consciousness: responds to stimulation    Nausea/Vomiting: no nausea/vomiting    Complications: none    Transfer of care protocol was followed      Last vitals:   Visit Vitals  BP (!) 154/89   Pulse 75   Temp 37.1 °C (98.8 °F) (Temporal)   Resp 18   Ht 5' 11" (1.803 m)   Wt 80.1 kg (176 lb 9.4 oz)   SpO2 98%   BMI 24.63 kg/m²     "

## 2020-01-24 RX ORDER — HYDROCODONE BITARTRATE AND ACETAMINOPHEN 10; 325 MG/1; MG/1
1 TABLET ORAL EVERY 8 HOURS PRN
Qty: 20 TABLET | Refills: 0 | Status: SHIPPED | OUTPATIENT
Start: 2020-01-24

## 2020-01-24 RX ORDER — IBUPROFEN 800 MG/1
800 TABLET ORAL 3 TIMES DAILY
Qty: 15 TABLET | Refills: 0 | Status: SHIPPED | OUTPATIENT
Start: 2020-01-24

## 2020-01-24 NOTE — TELEPHONE ENCOUNTER
----- Message from Suzanne Barker sent at 1/24/2020 12:26 PM CST -----  Contact: Patient   Patient is doing a follow up on the call from this morning regards to his medication being called in. Please call him at [phone].    Thanks  Td

## 2020-01-24 NOTE — TELEPHONE ENCOUNTER
----- Message from Gayatri Langston sent at 1/24/2020  9:00 AM CST -----  Contact: pt   .Type:  Needs Medical Advice    Who Called:  INES MONSON   Symptoms (please be specific):   How long has patient had these symptoms:   Pharmacy name and phone #:   Would the patient rather a call back or a response via [facility]? Call   Best Call Back Number:  [phone] (home)    Additional Information: Pt is requesting a call back from the nurse in regards to the pt surgery that  He had on the 01/17/2020

## 2020-01-24 NOTE — TELEPHONE ENCOUNTER
----- Message from Isha Noel sent at 1/24/2020 12:29 PM CST -----  Contact: pt   Pt is calling to follow up on if the pain med refill he was requesting was called in.. Pt states that he left a message this morning with all of the information/ [facility]   [address]

## 2020-01-24 NOTE — TELEPHONE ENCOUNTER
Patient states that he only has about 5 tablets left.     7/10 with pain pill. Trying to take pain medication every 5-6 hours with ibuprofen in between.     Patient just had a bowel movement a few days ago and the pain level is starting to pick back up since the. He is still swollen. Still using ice packs.

## 2020-01-29 ENCOUNTER — OFFICE VISIT (OUTPATIENT)
Dept: SURGERY | Facility: CLINIC | Age: 50
End: 2020-01-29
Payer: OTHER MISCELLANEOUS

## 2020-01-29 VITALS
WEIGHT: 176.56 LBS | TEMPERATURE: 98 F | SYSTOLIC BLOOD PRESSURE: 159 MMHG | BODY MASS INDEX: 24.72 KG/M2 | RESPIRATION RATE: 18 BRPM | OXYGEN SATURATION: 98 % | HEIGHT: 71 IN | DIASTOLIC BLOOD PRESSURE: 92 MMHG | HEART RATE: 74 BPM

## 2020-01-29 VITALS
TEMPERATURE: 98 F | HEIGHT: 71 IN | HEART RATE: 80 BPM | BODY MASS INDEX: 24.94 KG/M2 | WEIGHT: 178.13 LBS | DIASTOLIC BLOOD PRESSURE: 108 MMHG | SYSTOLIC BLOOD PRESSURE: 158 MMHG

## 2020-01-29 DIAGNOSIS — Z98.890 STATUS POST INGUINAL HERNIA REPAIR: Primary | ICD-10-CM

## 2020-01-29 DIAGNOSIS — Z87.19 STATUS POST INGUINAL HERNIA REPAIR: Primary | ICD-10-CM

## 2020-01-29 PROCEDURE — 99024 PR POST-OP FOLLOW-UP VISIT: ICD-10-PCS | Mod: S$GLB,,, | Performed by: SURGERY

## 2020-01-29 PROCEDURE — 99999 PR PBB SHADOW E&M-EST. PATIENT-LVL III: ICD-10-PCS | Mod: PBBFAC,,, | Performed by: SURGERY

## 2020-01-29 PROCEDURE — 99024 POSTOP FOLLOW-UP VISIT: CPT | Mod: S$GLB,,, | Performed by: SURGERY

## 2020-01-29 PROCEDURE — 99999 PR PBB SHADOW E&M-EST. PATIENT-LVL III: CPT | Mod: PBBFAC,,, | Performed by: SURGERY

## 2020-01-29 RX ORDER — GABAPENTIN 100 MG/1
100 CAPSULE ORAL 3 TIMES DAILY
Qty: 90 CAPSULE | Refills: 0 | Status: SHIPPED | OUTPATIENT
Start: 2020-01-29 | End: 2020-02-28

## 2020-01-31 PROBLEM — K40.91 RECURRENT INGUINAL HERNIA: Status: RESOLVED | Noted: 2020-01-17 | Resolved: 2020-01-31

## 2020-01-31 NOTE — PROGRESS NOTES
History & Physical    SUBJECTIVE:     History of Present Illness:  Patient is a 49 y.o. male status post robotic bilateral inguinal hernia 7/5/19 status post open left inguinal hernia repair 01/17/2020 presents for postop.  He reports some sharp pain but overall is swelling most of the pain is improving.    presents for left groin bulge.  He reports noticing a bulge that has progressively gotten bigger and more painful over the last month.  It will go down when he lies flat at night.    Initially referred for inguinal hernia.  Patient reports having bulge initially started his right groin with some discomfort but now has a bulge in his left groin as well. It does go down when he presses on it and lies flat.    Chief Complaint   Patient presents with    Post-op Evaluation       Review of patient's allergies indicates:  No Known Allergies    Current Outpatient Medications   Medication Sig Dispense Refill    docusate sodium (COLACE) 100 MG capsule Take 1 capsule (100 mg total) by mouth 3 (three) times daily as needed for Constipation. 30 capsule 0    hydroCHLOROthiazide (HYDRODIURIL) 25 MG tablet Take 1 tablet (25 mg total) by mouth once daily. 30 tablet 0    HYDROcodone-acetaminophen (NORCO)  mg per tablet Take 1 tablet by mouth every 8 (eight) hours as needed for Pain. 20 tablet 0    ibuprofen (ADVIL,MOTRIN) 800 MG tablet Take 1 tablet (800 mg total) by mouth 3 (three) times daily. 15 tablet 0    lisinopril (PRINIVIL,ZESTRIL) 40 MG tablet Take 1 tablet (40 mg total) by mouth once daily. 30 tablet 0    metoprolol succinate (TOPROL-XL) 50 MG 24 hr tablet Take 1 tablet (50 mg total) by mouth once daily. 30 tablet 0    gabapentin (NEURONTIN) 100 MG capsule Take 1 capsule (100 mg total) by mouth 3 (three) times daily. 90 capsule 0     No current facility-administered medications for this visit.        Past Medical History:   Diagnosis Date    Asthma     childhood    Depression     Hypertension      "Narcotic abuse      Past Surgical History:   Procedure Laterality Date    HERNIORRHAPHY OF RECURRENT INGUINAL HERNIA Left 1/17/2020    Procedure: REPAIR, HERNIA, INGUINAL, RECURRENT;  Surgeon: Sony Burger MD;  Location: [facility] OR;  Service: General;  Laterality: Left;    ROBOT-ASSISTED LAPAROSCOPIC REPAIR OF BILATERAL INGUINAL HERNIAS USING DA YENI XI Bilateral 4/23/2019    Procedure: XI ROBOTIC REPAIR, HERNIA, INGUINAL, BILATERAL;  Surgeon: Sony Burger MD;  Location: [facility] OR;  Service: General;  Laterality: Bilateral;     History reviewed. No pertinent family history.  Social History     Tobacco Use    Smoking status: Current Every Day Smoker     Packs/day: 0.50     Types: Cigarettes    Smokeless tobacco: Former User     Types: Chew    Tobacco comment: no tobacco;  after m.n prior to sx   Substance Use Topics    Alcohol use: Yes     Comment: socially;  No alcohol today 01/15/20 prior to sx    Drug use: No     Comment: Recovering narcotic addict        Review of Systems:  Review of Systems   Constitutional: Negative for chills, fever and unexpected weight change.   HENT: Negative for congestion.    Eyes: Negative for visual disturbance.   Respiratory: Negative for shortness of breath.    Cardiovascular: Negative for chest pain.   Gastrointestinal: Negative for abdominal distention, abdominal pain, constipation, nausea, rectal pain and vomiting.   Genitourinary: Negative for dysuria.   Musculoskeletal: Negative for arthralgias.   Skin: Negative for rash.   Neurological: Negative for light-headedness.   Hematological: Negative for adenopathy.       OBJECTIVE:     Vital Signs (Most Recent)  Temp: 98.1 °F (36.7 °C) (01/29/20 1123)  Pulse: 80 (01/29/20 1123)  BP: (!) 158/108 (01/29/20 1123)  5' 11" (1.803 m)  80.8 kg (178 lb 2.1 oz)     Physical Exam:  Physical Exam   Constitutional: He is oriented to person, place, and time. He appears well-developed and well-nourished. No distress.   HENT:   Head: " Normocephalic and atraumatic.   Eyes: EOM are normal.   Neck: Normal range of motion. Neck supple.   Cardiovascular: Normal rate and regular rhythm.   Pulmonary/Chest: Effort normal and breath sounds normal.   Abdominal: Soft. Bowel sounds are normal. He exhibits no distension. There is no tenderness. No hernia (Incision healing well no signs of infection).   Well-healed surgical incisions across abdomen no signs of infection     Neurological: He is alert and oriented to person, place, and time.   Skin: Skin is warm and dry.   Vitals reviewed.    CT:  1. Fluid collection in the right inguinal/scrotal region most consistent with a seroma. No evidence to suggest a recurrent hernia.      ASSESSMENT/PLAN:     48-year-old male status post bilateral right inguinal hernia repair, status post open left inguinal hernia repair    PLAN:Plan     Healing well  Rx gabapentin  Continue light duty x6 weeks  Follow-up in 6 weeks

## 2020-03-11 ENCOUNTER — OFFICE VISIT (OUTPATIENT)
Dept: SURGERY | Facility: CLINIC | Age: 50
End: 2020-03-11
Payer: OTHER MISCELLANEOUS

## 2020-03-11 VITALS
SYSTOLIC BLOOD PRESSURE: 147 MMHG | BODY MASS INDEX: 26.27 KG/M2 | WEIGHT: 187.63 LBS | HEIGHT: 71 IN | DIASTOLIC BLOOD PRESSURE: 97 MMHG | HEART RATE: 52 BPM | TEMPERATURE: 98 F

## 2020-03-11 DIAGNOSIS — K40.90 LEFT INGUINAL HERNIA: Primary | ICD-10-CM

## 2020-03-11 PROCEDURE — 99999 PR PBB SHADOW E&M-EST. PATIENT-LVL III: ICD-10-PCS | Mod: PBBFAC,,, | Performed by: SURGERY

## 2020-03-11 PROCEDURE — 99999 PR PBB SHADOW E&M-EST. PATIENT-LVL III: CPT | Mod: PBBFAC,,, | Performed by: SURGERY

## 2020-03-11 PROCEDURE — 99024 PR POST-OP FOLLOW-UP VISIT: ICD-10-PCS | Mod: S$GLB,,, | Performed by: SURGERY

## 2020-03-11 PROCEDURE — 99024 POSTOP FOLLOW-UP VISIT: CPT | Mod: S$GLB,,, | Performed by: SURGERY

## 2020-03-11 NOTE — PROGRESS NOTES
History & Physical    SUBJECTIVE:     History of Present Illness:  Patient is a 49 y.o. male status post robotic bilateral inguinal hernia 7/5/19 status post open left inguinal hernia repair 01/17/2020 presents for follow-up.  He is doing well with no complaints today.    presents for left groin bulge.  He reports noticing a bulge that has progressively gotten bigger and more painful over the last month.  It will go down when he lies flat at night.    Initially referred for inguinal hernia.  Patient reports having bulge initially started his right groin with some discomfort but now has a bulge in his left groin as well. It does go down when he presses on it and lies flat.    Chief Complaint   Patient presents with    Post-op Evaluation     inguinal hernia repair       Review of patient's allergies indicates:  No Known Allergies    Current Outpatient Medications   Medication Sig Dispense Refill    docusate sodium (COLACE) 100 MG capsule Take 1 capsule (100 mg total) by mouth 3 (three) times daily as needed for Constipation. 30 capsule 0    hydroCHLOROthiazide (HYDRODIURIL) 25 MG tablet Take 1 tablet (25 mg total) by mouth once daily. 30 tablet 0    HYDROcodone-acetaminophen (NORCO)  mg per tablet Take 1 tablet by mouth every 8 (eight) hours as needed for Pain. 20 tablet 0    ibuprofen (ADVIL,MOTRIN) 800 MG tablet Take 1 tablet (800 mg total) by mouth 3 (three) times daily. 15 tablet 0    gabapentin (NEURONTIN) 100 MG capsule Take 1 capsule (100 mg total) by mouth 3 (three) times daily. 90 capsule 0    lisinopril (PRINIVIL,ZESTRIL) 40 MG tablet Take 1 tablet (40 mg total) by mouth once daily. 30 tablet 0    metoprolol succinate (TOPROL-XL) 50 MG 24 hr tablet Take 1 tablet (50 mg total) by mouth once daily. 30 tablet 0     No current facility-administered medications for this visit.        Past Medical History:   Diagnosis Date    Asthma     childhood    Depression     Hypertension     Narcotic abuse   "    Past Surgical History:   Procedure Laterality Date    HERNIORRHAPHY OF RECURRENT INGUINAL HERNIA Left 1/17/2020    Procedure: REPAIR, HERNIA, INGUINAL, RECURRENT;  Surgeon: Sony Burger MD;  Location: [facility] OR;  Service: General;  Laterality: Left;    ROBOT-ASSISTED LAPAROSCOPIC REPAIR OF BILATERAL INGUINAL HERNIAS USING DA YENI XI Bilateral 4/23/2019    Procedure: XI ROBOTIC REPAIR, HERNIA, INGUINAL, BILATERAL;  Surgeon: Sony Burger MD;  Location: [facility] OR;  Service: General;  Laterality: Bilateral;     History reviewed. No pertinent family history.  Social History     Tobacco Use    Smoking status: Current Every Day Smoker     Packs/day: 0.50     Types: Cigarettes    Smokeless tobacco: Former User     Types: Chew    Tobacco comment: no tobacco;  after m.n prior to sx   Substance Use Topics    Alcohol use: Yes     Comment: socially;  No alcohol today 01/15/20 prior to sx    Drug use: No     Comment: Recovering narcotic addict        Review of Systems:  Review of Systems   Constitutional: Negative for chills, fever and unexpected weight change.   HENT: Negative for congestion.    Eyes: Negative for visual disturbance.   Respiratory: Negative for shortness of breath.    Cardiovascular: Negative for chest pain.   Gastrointestinal: Negative for abdominal distention, abdominal pain, constipation, nausea, rectal pain and vomiting.   Genitourinary: Negative for dysuria.   Musculoskeletal: Negative for arthralgias.   Skin: Negative for rash.   Neurological: Negative for light-headedness.   Hematological: Negative for adenopathy.       OBJECTIVE:     Vital Signs (Most Recent)  Temp: 98.1 °F (36.7 °C) (03/11/20 0958)  Pulse: (!) 52 (03/11/20 0958)  BP: (!) 147/97 (03/11/20 0958)  5' 11" (1.803 m)  85.1 kg (187 lb 9.8 oz)     Physical Exam:  Physical Exam   Constitutional: He is oriented to person, place, and time. He appears well-developed and well-nourished. No distress.   HENT:   Head: Normocephalic and " atraumatic.   Eyes: EOM are normal.   Neck: Normal range of motion. Neck supple.   Cardiovascular: Normal rate and regular rhythm.   Pulmonary/Chest: Effort normal and breath sounds normal.   Abdominal: Soft. Bowel sounds are normal. He exhibits no distension. There is no tenderness. No hernia (Incision healing well no signs of infection).   Well-healed surgical incisions across abdomen no signs of infection     Neurological: He is alert and oriented to person, place, and time.   Skin: Skin is warm and dry.   Vitals reviewed.    CT:  1. Fluid collection in the right inguinal/scrotal region most consistent with a seroma. No evidence to suggest a recurrent hernia.      ASSESSMENT/PLAN:     48-year-old male status post bilateral right inguinal hernia repair, status post open left inguinal hernia repair    PLAN:Plan     Well-healed  Okay to resume regular activity and return to work on 03/16/2020  Work excuse provided  Follow-up p.r.n.

## 2020-03-11 NOTE — LETTER
March 11, 2020    Rajesh Swan  [address] [place] [id number]             [facility]  Surgery  [address]  [place] [address]  Phone: [phone]  Fax: [phone]   March 11, 2020     Patient: Rajesh Swan   YOB: 1970   Date of Visit: 3/11/2020       To Whom it May Concern:    Rajesh Swan was seen in my clinic on 3/11/2020. He may return to work with no restrictions on 03/16/2020  Please excuse him from any classes or work missed.    If you have any questions or concerns, please don't hesitate to call.    Sincerely,         Sony Zabala

## (undated) DEVICE — SEE MEDLINE ITEM 152622

## (undated) DEVICE — EVACUATOR PENCIL SMOKE NEPTUNE

## (undated) DEVICE — SUT MONOCRYL 4.0 PS2 CP496G

## (undated) DEVICE — POSITIONER HEAD DONUT 9IN FOAM

## (undated) DEVICE — SUPPORT ULNA NERVE PROTECTOR

## (undated) DEVICE — GLOVE SURGICAL LATEX SZ 7

## (undated) DEVICE — SYR 10CC LUER LOCK

## (undated) DEVICE — APPLICATOR CHLORAPREP ORN 26ML

## (undated) DEVICE — KIT WING PAD POSITIONING

## (undated) DEVICE — SEE MEDLINE ITEM 157117

## (undated) DEVICE — COVER TIP CURVED SCISSORS XI

## (undated) DEVICE — SOL NS 1000CC

## (undated) DEVICE — SEE MEDLINE ITEM 157027

## (undated) DEVICE — MANIFOLD 4 PORT

## (undated) DEVICE — SUT STRATAFIX 2-0 30CM

## (undated) DEVICE — SUT VICRYL 3-0 27 SH

## (undated) DEVICE — KIT ANTIFOG

## (undated) DEVICE — SYR ONLY LUER LOCK 20CC

## (undated) DEVICE — SOL 9P NACL IRR PIC IL

## (undated) DEVICE — SUT VICRYL 0 27 CT-2

## (undated) DEVICE — DRAPE ABDOMINAL TIBURON 14X11

## (undated) DEVICE — NDL SAFETY 25G X 1.5 ECLIPSE

## (undated) DEVICE — SUT VICRYL CTD 2-0 GI 27 SH

## (undated) DEVICE — NDL SAFETY 22G X 1.5 ECLIPSE

## (undated) DEVICE — CLIPPER BLADE MOD 4406 (CAREF)

## (undated) DEVICE — ELECTRODE REM PLYHSV RETURN 9

## (undated) DEVICE — COVER OVERHEAD SURG LT BLUE

## (undated) DEVICE — Device

## (undated) DEVICE — SEE MEDLINE ITEM 146420

## (undated) DEVICE — ADHESIVE DERMABOND ADVANCED

## (undated) DEVICE — DRAIN PENRS STERILE LTX 18X1/2

## (undated) DEVICE — NDL PNEUMO INSUFFLATI 120MM

## (undated) DEVICE — GAUZE SPONGE PEANUT STRL

## (undated) DEVICE — DRAPE ARM DAVINCI XI

## (undated) DEVICE — GLOVE PROTEXIS HYDROGEL SZ7

## (undated) DEVICE — SEAL UNIVERSAL 5MM-8MM XI

## (undated) DEVICE — UNDERGLOVES BIOGEL PI SZ 7 LF

## (undated) DEVICE — OBTURATOR BLADELESS 8MM XI CLR

## (undated) DEVICE — DRAPE COLUMN DAVINCI XI

## (undated) DEVICE — GLOVE 7.0 PROTEXIS PI BLUE

## (undated) DEVICE — DRAPE LAP TIBURON 77X122IN

## (undated) DEVICE — TUBING HEATED INSUFFLATOR

## (undated) DEVICE — DECANTER VIAL ASEPTIC TRANSFER

## (undated) DEVICE — CONTAINER SPECIMEN STRL 4OZ

## (undated) DEVICE — SYR 3CC LUER LOC

## (undated) DEVICE — EVACUATOR KIT SMOKE PLUME AWAY